# Patient Record
Sex: FEMALE | Race: WHITE | NOT HISPANIC OR LATINO | Employment: FULL TIME | URBAN - METROPOLITAN AREA
[De-identification: names, ages, dates, MRNs, and addresses within clinical notes are randomized per-mention and may not be internally consistent; named-entity substitution may affect disease eponyms.]

---

## 2019-04-12 ENCOUNTER — HOSPITAL ENCOUNTER (OUTPATIENT)
Dept: FAMILY MEDICINE CLINIC | Facility: CLINIC | Age: 29
Setting detail: SPECIMEN
Discharge: HOME | End: 2019-04-12
Attending: FAMILY MEDICINE | Admitting: FAMILY MEDICINE

## 2019-04-12 LAB
ALBUMIN SERPL-MCNC: 4 G/DL (ref 3.5–4.8)
ALBUMIN/GLOB SERPL: 1.4 {RATIO} (ref 1–1.7)
ALP SERPL-CCNC: 59 IU/L (ref 32–91)
ALT SERPL-CCNC: 19 IU/L (ref 14–54)
ANION GAP SERPL CALC-SCNC: 14 MMOL/L (ref 10–20)
AST SERPL-CCNC: 21 IU/L (ref 15–41)
BASOPHILS # BLD AUTO: 0 10*3/UL (ref 0–0.2)
BASOPHILS NFR BLD AUTO: 1 % (ref 0–2)
BILIRUB SERPL-MCNC: 1 MG/DL (ref 0.3–1.2)
BUN SERPL-MCNC: 12 MG/DL (ref 8–20)
BUN/CREAT SERPL: 17.1 (ref 5.4–26.2)
CALCIUM SERPL-MCNC: 9 MG/DL (ref 8.9–10.3)
CHLORIDE SERPL-SCNC: 102 MMOL/L (ref 101–111)
CHOLEST SERPL-MCNC: 153 MG/DL
CHOLEST/HDLC SERPL: 3 {RATIO}
CONV CO2: 23 MMOL/L (ref 22–32)
CONV LDL CHOLESTEROL DIRECT: 87 MG/DL (ref 0–100)
CONV TOTAL PROTEIN: 6.8 G/DL (ref 6.1–7.9)
CREAT UR-MCNC: 0.7 MG/DL (ref 0.4–1)
DIFFERENTIAL METHOD BLD: (no result)
EOSINOPHIL # BLD AUTO: 0.1 10*3/UL (ref 0–0.3)
EOSINOPHIL # BLD AUTO: 1 % (ref 0–3)
ERYTHROCYTE [DISTWIDTH] IN BLOOD BY AUTOMATED COUNT: 13.5 % (ref 11.5–14.5)
GLOBULIN UR ELPH-MCNC: 2.8 G/DL (ref 2.5–3.8)
GLUCOSE SERPL-MCNC: 83 MG/DL (ref 65–99)
HCT VFR BLD AUTO: 38.5 % (ref 35–49)
HDLC SERPL-MCNC: 51 MG/DL
HGB BLD-MCNC: 13 G/DL (ref 12–15)
LDLC/HDLC SERPL: 1.7 {RATIO}
LIPID INTERPRETATION: NORMAL
LYMPHOCYTES # BLD AUTO: 3.1 10*3/UL (ref 0.8–4.8)
LYMPHOCYTES NFR BLD AUTO: 32 % (ref 18–42)
MCH RBC QN AUTO: 29.6 PG (ref 26–32)
MCHC RBC AUTO-ENTMCNC: 33.7 G/DL (ref 32–36)
MCV RBC AUTO: 87.9 FL (ref 80–94)
MONOCYTES # BLD AUTO: 0.9 10*3/UL (ref 0.1–1.3)
MONOCYTES NFR BLD AUTO: 9 % (ref 2–11)
NEUTROPHILS # BLD AUTO: 5.7 10*3/UL (ref 2.3–8.6)
NEUTROPHILS NFR BLD AUTO: 57 % (ref 50–75)
NRBC BLD AUTO-RTO: 0 /100{WBCS}
NRBC/RBC NFR BLD MANUAL: 0 10*3/UL
PLATELET # BLD AUTO: 217 10*3/UL (ref 150–450)
PMV BLD AUTO: 8.3 FL (ref 7.4–10.4)
POTASSIUM SERPL-SCNC: 4 MMOL/L (ref 3.6–5.1)
RBC # BLD AUTO: 4.38 10*6/UL (ref 4–5.4)
SODIUM SERPL-SCNC: 135 MMOL/L (ref 136–144)
TRIGL SERPL-MCNC: 47 MG/DL
VLDLC SERPL CALC-MCNC: 14.5 MG/DL
WBC # BLD AUTO: 9.9 10*3/UL (ref 4.5–11.5)

## 2019-05-14 ENCOUNTER — HOSPITAL ENCOUNTER (OUTPATIENT)
Dept: SLEEP MEDICINE | Facility: HOSPITAL | Age: 29
Discharge: HOME | End: 2019-05-14
Attending: INTERNAL MEDICINE | Admitting: INTERNAL MEDICINE

## 2019-06-19 ENCOUNTER — TELEPHONE (OUTPATIENT)
Dept: FAMILY MEDICINE CLINIC | Facility: CLINIC | Age: 29
End: 2019-06-19

## 2019-06-19 RX ORDER — SULFAMETHOXAZOLE AND TRIMETHOPRIM 800; 160 MG/1; MG/1
1 TABLET ORAL 2 TIMES DAILY
Qty: 14 TABLET | Refills: 0 | Status: SHIPPED | OUTPATIENT
Start: 2019-06-19 | End: 2019-06-26

## 2020-05-26 ENCOUNTER — LAB (OUTPATIENT)
Dept: FAMILY MEDICINE CLINIC | Facility: CLINIC | Age: 30
End: 2020-05-26

## 2020-05-26 ENCOUNTER — OFFICE VISIT (OUTPATIENT)
Dept: FAMILY MEDICINE CLINIC | Facility: CLINIC | Age: 30
End: 2020-05-26

## 2020-05-26 VITALS
TEMPERATURE: 97.8 F | HEART RATE: 78 BPM | BODY MASS INDEX: 49.28 KG/M2 | HEIGHT: 60 IN | SYSTOLIC BLOOD PRESSURE: 134 MMHG | DIASTOLIC BLOOD PRESSURE: 81 MMHG | WEIGHT: 251 LBS | OXYGEN SATURATION: 100 %

## 2020-05-26 DIAGNOSIS — E66.01 MORBID OBESITY WITH BODY MASS INDEX (BMI) OF 40.0 TO 49.9 (HCC): ICD-10-CM

## 2020-05-26 DIAGNOSIS — R63.5 ABNORMAL WEIGHT GAIN: ICD-10-CM

## 2020-05-26 DIAGNOSIS — R63.5 ABNORMAL WEIGHT GAIN: Primary | ICD-10-CM

## 2020-05-26 DIAGNOSIS — Z71.3 ENCOUNTER FOR WEIGHT LOSS COUNSELING: ICD-10-CM

## 2020-05-26 PROBLEM — E55.9 VITAMIN D DEFICIENCY: Status: ACTIVE | Noted: 2019-04-15

## 2020-05-26 LAB
ALBUMIN SERPL-MCNC: 4.4 G/DL (ref 3.5–5.2)
ALBUMIN/GLOB SERPL: 1.6 G/DL
ALP SERPL-CCNC: 66 U/L (ref 39–117)
ALT SERPL W P-5'-P-CCNC: 16 U/L (ref 1–33)
ANION GAP SERPL CALCULATED.3IONS-SCNC: 9.4 MMOL/L (ref 5–15)
AST SERPL-CCNC: 14 U/L (ref 1–32)
BILIRUB SERPL-MCNC: 0.3 MG/DL (ref 0.2–1.2)
BUN BLD-MCNC: 17 MG/DL (ref 6–20)
BUN/CREAT SERPL: 27.9 (ref 7–25)
CALCIUM SPEC-SCNC: 9.6 MG/DL (ref 8.6–10.5)
CHLORIDE SERPL-SCNC: 102 MMOL/L (ref 98–107)
CO2 SERPL-SCNC: 28.6 MMOL/L (ref 22–29)
CREAT BLD-MCNC: 0.61 MG/DL (ref 0.57–1)
GFR SERPL CREATININE-BSD FRML MDRD: 115 ML/MIN/1.73
GLOBULIN UR ELPH-MCNC: 2.7 GM/DL
GLUCOSE BLD-MCNC: 94 MG/DL (ref 65–99)
POTASSIUM BLD-SCNC: 4.4 MMOL/L (ref 3.5–5.2)
PROT SERPL-MCNC: 7.1 G/DL (ref 6–8.5)
SODIUM BLD-SCNC: 140 MMOL/L (ref 136–145)
T3FREE SERPL-MCNC: 3.82 PG/ML (ref 2–4.4)
T4 FREE SERPL-MCNC: 1.28 NG/DL (ref 0.93–1.7)
TSH SERPL DL<=0.05 MIU/L-ACNC: 2.33 UIU/ML (ref 0.27–4.2)

## 2020-05-26 PROCEDURE — 99213 OFFICE O/P EST LOW 20 MIN: CPT | Performed by: FAMILY MEDICINE

## 2020-05-26 PROCEDURE — 84439 ASSAY OF FREE THYROXINE: CPT | Performed by: FAMILY MEDICINE

## 2020-05-26 PROCEDURE — 36415 COLL VENOUS BLD VENIPUNCTURE: CPT | Performed by: FAMILY MEDICINE

## 2020-05-26 PROCEDURE — 84443 ASSAY THYROID STIM HORMONE: CPT | Performed by: FAMILY MEDICINE

## 2020-05-26 PROCEDURE — 84481 FREE ASSAY (FT-3): CPT | Performed by: FAMILY MEDICINE

## 2020-05-26 PROCEDURE — 80053 COMPREHEN METABOLIC PANEL: CPT | Performed by: FAMILY MEDICINE

## 2020-05-26 RX ORDER — PHENTERMINE HYDROCHLORIDE 37.5 MG/1
37.5 TABLET ORAL
Qty: 30 TABLET | Refills: 0 | Status: SHIPPED | OUTPATIENT
Start: 2020-05-26 | End: 2020-06-22 | Stop reason: SDUPTHER

## 2020-05-26 RX ORDER — COPPER 313.4 MG/1
INTRAUTERINE DEVICE INTRAUTERINE
COMMUNITY
Start: 2019-04-12

## 2020-05-26 NOTE — PATIENT INSTRUCTIONS
Keep a food diary  Try using an juan antonio to help monitor calories - ex [employer]  Drink lots of water  Can do weight watchers  Keep working to lose weight through healthy eating and exercise.

## 2020-05-26 NOTE — PROGRESS NOTES
Subjective   Ashley Kenney is a 30 y.o. female.     She is here today concerned about weight gain and wants to talk about her weight loss options  Weight has increased with the pandemic  Gyms were closed and has been eating more  Was going to the [facility] prior to this and still not seeing weight loss  As result of this she is getting very frustrated  The weight is causing her to be a little more short of breath with her activity and triggering joint pain  There is a family history of thyroid disease       The following portions of the patient's history were reviewed and updated as appropriate: allergies, current medications, past family history, past medical history, past social history, past surgical history and problem list.  History reviewed. No pertinent past medical history.  Past Surgical History:   Procedure Laterality Date   • DENTAL PROCEDURE       Family History   Problem Relation Age of Onset   • Hyperlipidemia Mother    • Hypertension Mother    • Cancer Father    • Diabetes Paternal Grandmother      Social History     Socioeconomic History   • Marital status:      Spouse name: Not on file   • Number of children: Not on file   • Years of education: Not on file   • Highest education level: Not on file   Tobacco Use   • Smoking status: Never Smoker   • Smokeless tobacco: Never Used   Substance and Sexual Activity   • Alcohol use: Yes   • Drug use: Never         Current Outpatient Medications:   •  Cetirizine HCl (ZYRTEC ALLERGY) 10 MG capsule, ZYRTEC ALLERGY 10 MG CAPS, Disp: , Rfl:   •  fluticasone (FLONASE) 50 MCG/ACT nasal spray, 2 sprays into the nostril(s) as directed by provider Daily., Disp: , Rfl:   •  Paragard Intrauterine Copper intrauterine device IUD, PARAGARD INTRAUTERINE COPPER IUD, Disp: , Rfl:   •  phentermine (ADIPEX-P) 37.5 MG tablet, Take 1 tablet by mouth Every Morning Before Breakfast., Disp: 30 tablet, Rfl: 0    Review of Systems   Constitutional: Positive for unexpected weight  "gain. Negative for diaphoresis, fatigue, fever and unexpected weight loss.   Respiratory: Positive for shortness of breath. Negative for cough and chest tightness.    Cardiovascular: Negative for chest pain, palpitations and leg swelling.   Gastrointestinal: Negative for nausea and vomiting.   Musculoskeletal: Positive for arthralgias.   Neurological: Negative for dizziness, syncope and headache.     /81 (BP Location: Right arm, Patient Position: Sitting, Cuff Size: Large Adult)   Pulse 78   Temp 97.8 °F (36.6 °C) (Temporal)   Ht 152.4 cm (60\")   Wt 114 kg (251 lb)   SpO2 100%   Breastfeeding No   BMI 49.02 kg/m²       Objective   Physical Exam   Constitutional: She appears well-developed and well-nourished. No distress. She is morbidly obese.  HENT:   Head: Normocephalic and atraumatic.   Neck: Neck supple. No thyromegaly present.   Cardiovascular: Normal rate, regular rhythm, normal heart sounds and intact distal pulses. Exam reveals no gallop and no friction rub.   No murmur heard.  Pulmonary/Chest: Effort normal and breath sounds normal. No respiratory distress. She has no wheezes. She has no rales.   Musculoskeletal: She exhibits no edema.   Lymphadenopathy:     She has no cervical adenopathy.   Neurological: She is alert.   Skin: Skin is warm and dry.   Psychiatric: She has a normal mood and affect.   Nursing note and vitals reviewed.        Assessment/Plan   Problems Addressed this Visit     None      Visit Diagnoses     Abnormal weight gain    -  Primary    Relevant Orders    Comprehensive Metabolic Panel    TSH    T3, Free    T4, Free    Encounter for weight loss counseling        Relevant Medications    phentermine (ADIPEX-P) 37.5 MG tablet    Morbid obesity with body mass index (BMI) of 40.0 to 49.9 (CMS/[facility])        Relevant Medications    phentermine (ADIPEX-P) 37.5 MG tablet          She was counseled on the need for weight loss  Recommended that she start keeping a food diary  Recommended " that she make healthier food choices and start increasing her physical activity  Recommended that she use weight loss juan antonio like my fitness pal to help  Encouraged her to increase her water intake  I will start phentermine 37.5 mg and see her back in a month  I have also ordered blood work including a full thyroid panel  She is up-to-date on her Tdap with the last one being given in 2011

## 2020-05-26 NOTE — PROGRESS NOTES
"Subjective   Ashley Kenney is a 30 y.o. female.     Pt is here today to discuss weight loss options.        The following portions of the patient's history were reviewed and updated as appropriate: {history reviewed:20406::\"allergies\",\"current medications\",\"past family history\",\"past medical history\",\"past social history\",\"past surgical history\",\"problem list\"}.    Review of Systems    Objective   There were no vitals taken for this visit.  Physical Exam      Assessment/Plan     There are no diagnoses linked to this encounter.          Answers for HPI/ROS submitted by the patient on 5/20/2020   What is the primary reason for your visit?: Other  Please describe your symptoms.: I am needing to consult Dr. Fletcher about weight loss options  Have you had these symptoms before?: Yes  How long have you been having these symptoms?: Greater than 2 weeks    "

## 2020-06-22 DIAGNOSIS — Z71.3 ENCOUNTER FOR WEIGHT LOSS COUNSELING: ICD-10-CM

## 2020-06-22 DIAGNOSIS — E66.01 MORBID OBESITY WITH BODY MASS INDEX (BMI) OF 40.0 TO 49.9 (HCC): ICD-10-CM

## 2020-06-23 RX ORDER — PHENTERMINE HYDROCHLORIDE 37.5 MG/1
37.5 TABLET ORAL
Qty: 30 TABLET | Refills: 0 | Status: SHIPPED | OUTPATIENT
Start: 2020-06-23 | End: 2020-07-29 | Stop reason: SDUPTHER

## 2020-06-24 DIAGNOSIS — E66.01 MORBID OBESITY WITH BODY MASS INDEX (BMI) OF 40.0 TO 49.9 (HCC): ICD-10-CM

## 2020-06-24 DIAGNOSIS — Z71.3 ENCOUNTER FOR WEIGHT LOSS COUNSELING: ICD-10-CM

## 2020-06-25 RX ORDER — PHENTERMINE HYDROCHLORIDE 37.5 MG/1
TABLET ORAL
Qty: 30 TABLET | Refills: 0 | OUTPATIENT
Start: 2020-06-25

## 2020-07-01 ENCOUNTER — OFFICE VISIT (OUTPATIENT)
Dept: FAMILY MEDICINE CLINIC | Facility: CLINIC | Age: 30
End: 2020-07-01

## 2020-07-01 VITALS
WEIGHT: 228 LBS | DIASTOLIC BLOOD PRESSURE: 82 MMHG | TEMPERATURE: 97.7 F | BODY MASS INDEX: 44.76 KG/M2 | HEART RATE: 106 BPM | OXYGEN SATURATION: 99 % | HEIGHT: 60 IN | SYSTOLIC BLOOD PRESSURE: 120 MMHG

## 2020-07-01 DIAGNOSIS — Z71.3 WEIGHT LOSS COUNSELING, ENCOUNTER FOR: Primary | ICD-10-CM

## 2020-07-01 DIAGNOSIS — E66.01 MORBID OBESITY WITH BMI OF 40.0-44.9, ADULT (HCC): ICD-10-CM

## 2020-07-01 PROCEDURE — 99213 OFFICE O/P EST LOW 20 MIN: CPT | Performed by: FAMILY MEDICINE

## 2020-07-01 NOTE — PROGRESS NOTES
Subjective   Ashley Kenney is a 30 y.o. female.     Here for follow-up after her first month on phentermine  She has lost 13 pounds  Walking 2 miles a day  Tolerating the medication w/o prob       The following portions of the patient's history were reviewed and updated as appropriate: allergies, current medications, past family history, past medical history, past social history, past surgical history and problem list.  History reviewed. No pertinent past medical history.  Past Surgical History:   Procedure Laterality Date   • DENTAL PROCEDURE       Family History   Problem Relation Age of Onset   • Hyperlipidemia Mother    • Hypertension Mother    • Cancer Father    • Diabetes Paternal Grandmother      Social History     Socioeconomic History   • Marital status:      Spouse name: Not on file   • Number of children: Not on file   • Years of education: Not on file   • Highest education level: Not on file   Tobacco Use   • Smoking status: Never Smoker   • Smokeless tobacco: Never Used   Substance and Sexual Activity   • Alcohol use: Yes   • Drug use: Never         Current Outpatient Medications:   •  Cetirizine HCl (ZYRTEC ALLERGY) 10 MG capsule, ZYRTEC ALLERGY 10 MG CAPS, Disp: , Rfl:   •  fluticasone (FLONASE) 50 MCG/ACT nasal spray, 2 sprays into the nostril(s) as directed by provider Daily., Disp: , Rfl:   •  Paragard Intrauterine Copper intrauterine device IUD, PARAGARD INTRAUTERINE COPPER IUD, Disp: , Rfl:   •  phentermine (ADIPEX-P) 37.5 MG tablet, Take 1 tablet by mouth Every Morning Before Breakfast., Disp: 30 tablet, Rfl: 0    Review of Systems   Constitutional: Negative for diaphoresis, fatigue, fever, unexpected weight gain and unexpected weight loss.   Respiratory: Negative for cough, chest tightness and shortness of breath.    Cardiovascular: Negative for chest pain, palpitations and leg swelling.   Gastrointestinal: Negative for nausea and vomiting.   Neurological: Negative for dizziness,  "syncope and headache.     /82 (BP Location: Left arm, Patient Position: Sitting, Cuff Size: Large Adult)   Pulse 106   Temp 97.7 °F (36.5 °C) (Temporal)   Ht 152.4 cm (60\")   Wt 103 kg (228 lb)   SpO2 99%   Breastfeeding No   BMI 44.53 kg/m²       Objective   Physical Exam   Constitutional: She appears well-developed and well-nourished. No distress. She is morbidly obese.  HENT:   Head: Normocephalic and atraumatic.   Neck: Neck supple.   Cardiovascular: Normal rate, regular rhythm, normal heart sounds and intact distal pulses. Exam reveals no gallop and no friction rub.   No murmur heard.  Pulmonary/Chest: Effort normal and breath sounds normal. No respiratory distress. She has no wheezes. She has no rales.   Musculoskeletal: She exhibits no edema.   Neurological: She is alert.   Skin: Skin is warm and dry.   Psychiatric: She has a normal mood and affect.   Nursing note and vitals reviewed.        Assessment/Plan   Problems Addressed this Visit        Digestive    Morbid obesity with BMI of 40.0-44.9, adult (CMS/HCC)       Other    Weight loss counseling, encounter for - Primary        She is doing very well  Her phentermine prescription was refilled last week secondary to the delay in her being able to get her appointment  I will see her back in a month  She was counseled on the importance of continuing to do her exercise and make improve dietary choices         "

## 2020-07-29 ENCOUNTER — OFFICE VISIT (OUTPATIENT)
Dept: FAMILY MEDICINE CLINIC | Facility: CLINIC | Age: 30
End: 2020-07-29

## 2020-07-29 VITALS
TEMPERATURE: 98.9 F | BODY MASS INDEX: 43.59 KG/M2 | SYSTOLIC BLOOD PRESSURE: 138 MMHG | HEART RATE: 69 BPM | WEIGHT: 222 LBS | OXYGEN SATURATION: 100 % | HEIGHT: 60 IN | DIASTOLIC BLOOD PRESSURE: 82 MMHG

## 2020-07-29 DIAGNOSIS — E66.01 MORBID OBESITY WITH BODY MASS INDEX (BMI) OF 40.0 TO 49.9 (HCC): ICD-10-CM

## 2020-07-29 DIAGNOSIS — Z71.3 ENCOUNTER FOR WEIGHT LOSS COUNSELING: ICD-10-CM

## 2020-07-29 PROCEDURE — 99213 OFFICE O/P EST LOW 20 MIN: CPT | Performed by: FAMILY MEDICINE

## 2020-07-29 RX ORDER — PHENTERMINE HYDROCHLORIDE 37.5 MG/1
37.5 TABLET ORAL
Qty: 30 TABLET | Refills: 0 | Status: SHIPPED | OUTPATIENT
Start: 2020-07-29 | End: 2020-08-28 | Stop reason: SDUPTHER

## 2020-07-29 NOTE — PROGRESS NOTES
Subjective   Ashley Kenney is a 30 y.o. female.     She is here for follow-up after her second month on phentermine  She has lost 6 more pounds  She started her period today  She also started strength training in addition to cardio  Using [employer] juan antonio         The following portions of the patient's history were reviewed and updated as appropriate: allergies, current medications, past family history, past medical history, past social history, past surgical history and problem list.  History reviewed. No pertinent past medical history.  Past Surgical History:   Procedure Laterality Date   • DENTAL PROCEDURE       Family History   Problem Relation Age of Onset   • Hyperlipidemia Mother    • Hypertension Mother    • Cancer Father    • Diabetes Paternal Grandmother      Social History     Socioeconomic History   • Marital status:      Spouse name: Not on file   • Number of children: Not on file   • Years of education: Not on file   • Highest education level: Not on file   Tobacco Use   • Smoking status: Never Smoker   • Smokeless tobacco: Never Used   Substance and Sexual Activity   • Alcohol use: Yes   • Drug use: Never         Current Outpatient Medications:   •  Cetirizine HCl (ZYRTEC ALLERGY) 10 MG capsule, ZYRTEC ALLERGY 10 MG CAPS, Disp: , Rfl:   •  fluticasone (FLONASE) 50 MCG/ACT nasal spray, 2 sprays into the nostril(s) as directed by provider Daily., Disp: , Rfl:   •  Paragard Intrauterine Copper intrauterine device IUD, PARAGARD INTRAUTERINE COPPER IUD, Disp: , Rfl:   •  phentermine (ADIPEX-P) 37.5 MG tablet, Take 1 tablet by mouth Every Morning Before Breakfast., Disp: 30 tablet, Rfl: 0    Review of Systems   Constitutional: Negative for diaphoresis, fatigue, fever, unexpected weight gain and unexpected weight loss.   Respiratory: Negative for cough, chest tightness and shortness of breath.    Cardiovascular: Negative for chest pain, palpitations and leg swelling.   Gastrointestinal: Negative  "for nausea and vomiting.   Neurological: Negative for dizziness, syncope and headache.     /82 (BP Location: Left arm, Patient Position: Sitting, Cuff Size: Large Adult)   Pulse 69   Temp 98.9 °F (37.2 °C) (Temporal)   Ht 152.4 cm (60\")   Wt 101 kg (222 lb)   SpO2 100%   Breastfeeding No   BMI 43.36 kg/m²       Objective   Physical Exam   Constitutional: She appears well-developed and well-nourished. No distress. She is morbidly obese.  HENT:   Head: Normocephalic and atraumatic.   Neck: Neck supple.   Cardiovascular: Normal rate, regular rhythm, normal heart sounds and intact distal pulses. Exam reveals no gallop and no friction rub.   No murmur heard.  Pulmonary/Chest: Effort normal and breath sounds normal. No respiratory distress. She has no wheezes. She has no rales.   Musculoskeletal: She exhibits no edema.   Neurological: She is alert.   Skin: Skin is warm and dry.   Psychiatric: She has a normal mood and affect.   Nursing note and vitals reviewed.  2      Assessment/Plan   Problems Addressed this Visit     None      Visit Diagnoses     Encounter for weight loss counseling        Relevant Medications    phentermine (ADIPEX-P) 37.5 MG tablet    Morbid obesity with body mass index (BMI) of 40.0 to 49.9 (CMS/[facility])        Relevant Medications    phentermine (ADIPEX-P) 37.5 MG tablet          Counseled on the need to for continued weight loss  Refilled the phentermine  Will see her back in a month       "

## 2020-07-29 NOTE — PATIENT INSTRUCTIONS
Keep calories around [id number]   Stay active  Keep working to lose weight through healthy eating and exercise.

## 2020-08-28 ENCOUNTER — OFFICE VISIT (OUTPATIENT)
Dept: FAMILY MEDICINE CLINIC | Facility: CLINIC | Age: 30
End: 2020-08-28

## 2020-08-28 VITALS
DIASTOLIC BLOOD PRESSURE: 84 MMHG | SYSTOLIC BLOOD PRESSURE: 122 MMHG | BODY MASS INDEX: 40.84 KG/M2 | HEIGHT: 60 IN | WEIGHT: 208 LBS | HEART RATE: 81 BPM | OXYGEN SATURATION: 100 % | TEMPERATURE: 98.2 F

## 2020-08-28 DIAGNOSIS — E66.01 MORBID OBESITY WITH BODY MASS INDEX (BMI) OF 40.0 TO 49.9 (HCC): ICD-10-CM

## 2020-08-28 DIAGNOSIS — Z71.3 ENCOUNTER FOR WEIGHT LOSS COUNSELING: ICD-10-CM

## 2020-08-28 PROCEDURE — 99213 OFFICE O/P EST LOW 20 MIN: CPT | Performed by: FAMILY MEDICINE

## 2020-08-28 RX ORDER — PHENTERMINE HYDROCHLORIDE 37.5 MG/1
37.5 TABLET ORAL
Qty: 30 TABLET | Refills: 0 | Status: SHIPPED | OUTPATIENT
Start: 2020-08-28 | End: 2020-09-25

## 2020-08-28 NOTE — PROGRESS NOTES
Subjective   Ashley Kenney is a 30 y.o. female.     Comes here for follow-up after her third month on phentermine  She has lost 14 more pounds  She feels well  She is tolerating the medication w/o problem       The following portions of the patient's history were reviewed and updated as appropriate: allergies, current medications, past family history, past medical history, past social history, past surgical history and problem list.  History reviewed. No pertinent past medical history.  Past Surgical History:   Procedure Laterality Date   • DENTAL PROCEDURE       Family History   Problem Relation Age of Onset   • Hyperlipidemia Mother    • Hypertension Mother    • Cancer Father    • Diabetes Paternal Grandmother      Social History     Socioeconomic History   • Marital status:      Spouse name: Not on file   • Number of children: Not on file   • Years of education: Not on file   • Highest education level: Not on file   Tobacco Use   • Smoking status: Never Smoker   • Smokeless tobacco: Never Used   Substance and Sexual Activity   • Alcohol use: Yes   • Drug use: Never         Current Outpatient Medications:   •  Cetirizine HCl (ZYRTEC ALLERGY) 10 MG capsule, ZYRTEC ALLERGY 10 MG CAPS, Disp: , Rfl:   •  fluticasone (FLONASE) 50 MCG/ACT nasal spray, 2 sprays into the nostril(s) as directed by provider Daily., Disp: , Rfl:   •  Paragard Intrauterine Copper intrauterine device IUD, PARAGARD INTRAUTERINE COPPER IUD, Disp: , Rfl:   •  phentermine (ADIPEX-P) 37.5 MG tablet, Take 1 tablet by mouth Every Morning Before Breakfast., Disp: 30 tablet, Rfl: 0    Review of Systems   Constitutional: Negative for diaphoresis, fatigue, fever, unexpected weight gain and unexpected weight loss.   Respiratory: Negative for cough, chest tightness and shortness of breath.    Cardiovascular: Negative for chest pain, palpitations and leg swelling.   Gastrointestinal: Negative for nausea and vomiting.   Neurological: Negative for  "dizziness, syncope and headache.     /84 (BP Location: Left arm, Patient Position: Sitting, Cuff Size: Large Adult)   Pulse 81   Temp 98.2 °F (36.8 °C) (Temporal)   Ht 152.4 cm (60\")   Wt 94.3 kg (208 lb)   SpO2 100%   Breastfeeding No   BMI 40.62 kg/m²       Objective   Physical Exam   Constitutional: She appears well-developed and well-nourished. No distress. She is morbidly obese.  HENT:   Head: Normocephalic and atraumatic.   Neck: Neck supple.   Cardiovascular: Normal rate, regular rhythm, normal heart sounds and intact distal pulses. Exam reveals no gallop and no friction rub.   No murmur heard.  Pulmonary/Chest: Effort normal and breath sounds normal. No respiratory distress. She has no wheezes. She has no rales.   Musculoskeletal: She exhibits no edema.   Neurological: She is alert.   Skin: Skin is warm and dry.   Psychiatric: She has a normal mood and affect.   Nursing note and vitals reviewed.        Assessment/Plan   Problems Addressed this Visit     None      Visit Diagnoses     Encounter for weight loss counseling        Relevant Medications    phentermine (ADIPEX-P) 37.5 MG tablet    Morbid obesity with body mass index (BMI) of 40.0 to 49.9 (CMS/[facility])        Relevant Medications    phentermine (ADIPEX-P) 37.5 MG tablet          Doing very well on the phentermine; tolerating the prescription without problem; prescription refilled; will follow up in a month  Counseled on the need for continued weight loss       "

## 2020-09-25 ENCOUNTER — OFFICE VISIT (OUTPATIENT)
Dept: FAMILY MEDICINE CLINIC | Facility: CLINIC | Age: 30
End: 2020-09-25

## 2020-09-25 VITALS
SYSTOLIC BLOOD PRESSURE: 120 MMHG | DIASTOLIC BLOOD PRESSURE: 79 MMHG | BODY MASS INDEX: 39.07 KG/M2 | HEART RATE: 97 BPM | TEMPERATURE: 98.6 F | OXYGEN SATURATION: 100 % | HEIGHT: 60 IN | WEIGHT: 199 LBS

## 2020-09-25 DIAGNOSIS — Z71.3 WEIGHT LOSS COUNSELING, ENCOUNTER FOR: Primary | ICD-10-CM

## 2020-09-25 PROCEDURE — 99213 OFFICE O/P EST LOW 20 MIN: CPT | Performed by: FAMILY MEDICINE

## 2020-09-25 RX ORDER — PHENTERMINE HYDROCHLORIDE 15 MG/1
15 CAPSULE ORAL EVERY MORNING
Qty: 90 CAPSULE | Refills: 0 | Status: SHIPPED | OUTPATIENT
Start: 2020-09-25 | End: 2020-12-18 | Stop reason: SDUPTHER

## 2020-09-25 RX ORDER — TOPIRAMATE 25 MG/1
25 TABLET ORAL DAILY
Qty: 90 TABLET | Refills: 0 | Status: SHIPPED | OUTPATIENT
Start: 2020-09-25 | End: 2020-12-18 | Stop reason: SDUPTHER

## 2020-09-25 NOTE — PROGRESS NOTES
Subjective   Ashley Kenney is a 30 y.o. female.     Here for follow-up after her fourth month on phentermine  She started at 251 pounds and is now down to 199 pounds  She is tolerating the medication w/o prob  She is exercising and making better food choices       The following portions of the patient's history were reviewed and updated as appropriate: allergies, current medications, past family history, past medical history, past social history, past surgical history and problem list.  History reviewed. No pertinent past medical history.  Past Surgical History:   Procedure Laterality Date   • DENTAL PROCEDURE       Family History   Problem Relation Age of Onset   • Hyperlipidemia Mother    • Hypertension Mother    • Cancer Father    • Diabetes Paternal Grandmother      Social History     Socioeconomic History   • Marital status:      Spouse name: Not on file   • Number of children: Not on file   • Years of education: Not on file   • Highest education level: Not on file   Tobacco Use   • Smoking status: Never Smoker   • Smokeless tobacco: Never Used   Substance and Sexual Activity   • Alcohol use: Yes   • Drug use: Never         Current Outpatient Medications:   •  Cetirizine HCl (ZYRTEC ALLERGY) 10 MG capsule, ZYRTEC ALLERGY 10 MG CAPS, Disp: , Rfl:   •  fluticasone (FLONASE) 50 MCG/ACT nasal spray, 2 sprays into the nostril(s) as directed by provider Daily., Disp: , Rfl:   •  Paragard Intrauterine Copper intrauterine device IUD, PARAGARD INTRAUTERINE COPPER IUD, Disp: , Rfl:   •  phentermine 15 MG capsule, Take 1 capsule by mouth Every Morning., Disp: 90 capsule, Rfl: 0  •  topiramate (Topamax) 25 MG tablet, Take 1 tablet by mouth Daily., Disp: 90 tablet, Rfl: 0    Review of Systems   Constitutional: Negative.    Respiratory: Negative.    Cardiovascular: Negative.    Gastrointestinal: Negative for nausea and vomiting.   Endocrine: Negative.    Neurological: Negative for dizziness and headache.     BP  "120/79 (BP Location: Right arm, Patient Position: Sitting, Cuff Size: Large Adult)   Pulse 97   Temp 98.6 °F (37 °C) (Temporal)   Ht 152.4 cm (60\")   Wt 90.3 kg (199 lb)   SpO2 100%   Breastfeeding No   BMI 38.86 kg/m²       Objective   Physical Exam  Vitals signs and nursing note reviewed.   Constitutional:       Appearance: Normal appearance. She is obese. She is not ill-appearing.   HENT:      Head: Normocephalic and atraumatic.   Cardiovascular:      Rate and Rhythm: Normal rate and regular rhythm.      Pulses: Normal pulses.      Heart sounds: Normal heart sounds. No murmur.   Pulmonary:      Effort: Pulmonary effort is normal.   Musculoskeletal:      Right lower leg: No edema.      Left lower leg: No edema.   Skin:     General: Skin is warm and dry.   Neurological:      Mental Status: She is alert.   Psychiatric:         Mood and Affect: Mood normal.           Assessment/Plan   Problems Addressed this Visit        Other    Weight loss counseling, encounter for - Primary    Relevant Medications    phentermine 15 MG capsule    topiramate (Topamax) 25 MG tablet    Body mass index (bmi) 38.0-38.9, adult    Relevant Medications    phentermine 15 MG capsule    topiramate (Topamax) 25 MG tablet      Diagnoses       Codes Comments    Weight loss counseling, encounter for    -  Primary ICD-10-CM: Z71.3  ICD-9-CM: V65.3     Body mass index (bmi) 38.0-38.9, adult     ICD-10-CM: Z68.38  ICD-9-CM: V85.38           Will stop the phentermine 37.5 and start her on a lower dose and add topamax 25mg  Counseled her on the need for continued weight loss  Will see her back in 3 mo       "

## 2020-12-18 ENCOUNTER — OFFICE VISIT (OUTPATIENT)
Dept: FAMILY MEDICINE CLINIC | Facility: CLINIC | Age: 30
End: 2020-12-18

## 2020-12-18 VITALS
DIASTOLIC BLOOD PRESSURE: 81 MMHG | SYSTOLIC BLOOD PRESSURE: 113 MMHG | HEART RATE: 83 BPM | WEIGHT: 179.8 LBS | HEIGHT: 62 IN | BODY MASS INDEX: 33.09 KG/M2 | TEMPERATURE: 97.8 F | OXYGEN SATURATION: 100 %

## 2020-12-18 DIAGNOSIS — Z71.3 WEIGHT LOSS COUNSELING, ENCOUNTER FOR: ICD-10-CM

## 2020-12-18 PROCEDURE — 99213 OFFICE O/P EST LOW 20 MIN: CPT | Performed by: FAMILY MEDICINE

## 2020-12-18 RX ORDER — TOPIRAMATE 25 MG/1
25 TABLET ORAL DAILY
Qty: 90 TABLET | Refills: 0 | Status: SHIPPED | OUTPATIENT
Start: 2020-12-18 | End: 2021-03-30 | Stop reason: SDUPTHER

## 2020-12-18 RX ORDER — PHENTERMINE HYDROCHLORIDE 15 MG/1
15 CAPSULE ORAL EVERY MORNING
Qty: 90 CAPSULE | Refills: 0 | Status: SHIPPED | OUTPATIENT
Start: 2020-12-18 | End: 2021-03-30 | Stop reason: SDUPTHER

## 2020-12-18 NOTE — PATIENT INSTRUCTIONS
Keep working to lose weight through healthy eating and exercise.  Hot showers and warm compresses to neck and shoulders.

## 2020-12-18 NOTE — PROGRESS NOTES
Subjective   Ashley Kenney is a 30 y.o. female.     She is here for follow up after her first 3 months on phentermine/topamax  She is now down to her pre-baby weight  She has lost another 20 pounds  She is eating better and is exercising  Having shoulder pain - bilateral  Seeing chiro today  Doing yoga  Feels like her right side is stiffer than the left  Sores/infected hair  Take a while to heal       The following portions of the patient's history were reviewed and updated as appropriate: allergies, current medications, past family history, past medical history, past social history, past surgical history and problem list.  History reviewed. No pertinent past medical history.  Past Surgical History:   Procedure Laterality Date   • DENTAL PROCEDURE       Family History   Problem Relation Age of Onset   • Hyperlipidemia Mother    • Hypertension Mother    • Cancer Father    • Diabetes Paternal Grandmother      Social History     Socioeconomic History   • Marital status:      Spouse name: Not on file   • Number of children: Not on file   • Years of education: Not on file   • Highest education level: Not on file   Tobacco Use   • Smoking status: Never Smoker   • Smokeless tobacco: Never Used   Substance and Sexual Activity   • Alcohol use: Yes     Comment: occ   • Drug use: Never         Current Outpatient Medications:   •  Cetirizine HCl (ZYRTEC ALLERGY) 10 MG capsule, ZYRTEC ALLERGY 10 MG CAPS, Disp: , Rfl:   •  fluticasone (FLONASE) 50 MCG/ACT nasal spray, 2 sprays into the nostril(s) as directed by provider Daily., Disp: , Rfl:   •  Paragard Intrauterine Copper intrauterine device IUD, PARAGARD INTRAUTERINE COPPER IUD, Disp: , Rfl:   •  phentermine 15 MG capsule, Take 1 capsule by mouth Every Morning., Disp: 90 capsule, Rfl: 0  •  topiramate (Topamax) 25 MG tablet, Take 1 tablet by mouth Daily., Disp: 90 tablet, Rfl: 0    Review of Systems   Constitutional: Negative for diaphoresis, fatigue, fever,  "unexpected weight gain and unexpected weight loss.   Respiratory: Negative for cough, chest tightness and shortness of breath.    Cardiovascular: Negative for chest pain, palpitations and leg swelling.   Gastrointestinal: Negative for nausea and vomiting.   Musculoskeletal: Positive for arthralgias and neck pain.   Neurological: Negative for dizziness, syncope and headache.     /81 (BP Location: Right arm, Patient Position: Sitting, Cuff Size: Large Adult)   Pulse 83   Temp 97.8 °F (36.6 °C) (Temporal)   Ht 156.2 cm (61.5\")   Wt 81.6 kg (179 lb 12.8 oz)   SpO2 100%   Breastfeeding No   BMI 33.42 kg/m²       Objective   Physical Exam  Vitals signs and nursing note reviewed.   Constitutional:       General: She is not in acute distress.     Appearance: Normal appearance. She is well-developed. She is obese.   HENT:      Head: Normocephalic and atraumatic.   Neck:      Musculoskeletal: Neck supple.      Thyroid: No thyromegaly.      Vascular: No JVD.   Cardiovascular:      Rate and Rhythm: Normal rate and regular rhythm.      Heart sounds: Normal heart sounds. No murmur. No friction rub. No gallop.    Pulmonary:      Effort: Pulmonary effort is normal. No respiratory distress.      Breath sounds: Normal breath sounds. No wheezing or rales.   Musculoskeletal:      Right lower leg: No edema.      Left lower leg: No edema.   Lymphadenopathy:      Cervical: No cervical adenopathy.   Skin:     General: Skin is warm and dry.   Neurological:      Mental Status: She is alert.   Psychiatric:         Mood and Affect: Mood normal.         Behavior: Behavior is cooperative.           Assessment/Plan   Problems Addressed this Visit        Other    Weight loss counseling, encounter for    Relevant Medications    phentermine 15 MG capsule    topiramate (Topamax) 25 MG tablet    BMI 34.0-34.9,adult - Primary    Relevant Medications    phentermine 15 MG capsule    topiramate (Topamax) 25 MG tablet      Other Visit " Diagnoses     Body mass index (BMI) of 38.0 to 38.9 in adult          Diagnoses       Codes Comments    BMI 34.0-34.9,adult    -  Primary ICD-10-CM: Z68.34  ICD-9-CM: V85.34     Weight loss counseling, encounter for     ICD-10-CM: Z71.3  ICD-9-CM: V65.3     Body mass index (BMI) of 38.0 to 38.9 in adult     ICD-10-CM: Z68.38  ICD-9-CM: V85.38         She will continue the combination of phentermine and topamax  Will see her back in 3mo  Counseled on the need for continued weight loss through exercise and improved diet

## 2021-03-30 ENCOUNTER — OFFICE VISIT (OUTPATIENT)
Dept: FAMILY MEDICINE CLINIC | Facility: CLINIC | Age: 31
End: 2021-03-30

## 2021-03-30 VITALS
BODY MASS INDEX: 31.65 KG/M2 | TEMPERATURE: 97.3 F | HEIGHT: 62 IN | DIASTOLIC BLOOD PRESSURE: 78 MMHG | OXYGEN SATURATION: 100 % | WEIGHT: 172 LBS | SYSTOLIC BLOOD PRESSURE: 120 MMHG | HEART RATE: 81 BPM

## 2021-03-30 DIAGNOSIS — Z71.3 WEIGHT LOSS COUNSELING, ENCOUNTER FOR: ICD-10-CM

## 2021-03-30 PROCEDURE — 99213 OFFICE O/P EST LOW 20 MIN: CPT | Performed by: FAMILY MEDICINE

## 2021-03-30 RX ORDER — TOPIRAMATE 25 MG/1
25 TABLET ORAL DAILY
Qty: 90 TABLET | Refills: 0 | Status: SHIPPED | OUTPATIENT
Start: 2021-03-30 | End: 2022-03-25

## 2021-03-30 RX ORDER — PHENTERMINE HYDROCHLORIDE 15 MG/1
15 CAPSULE ORAL EVERY MORNING
Qty: 90 CAPSULE | Refills: 0 | Status: SHIPPED | OUTPATIENT
Start: 2021-03-30 | End: 2022-03-25

## 2021-03-30 NOTE — PROGRESS NOTES
Subjective   Ashley Kenney is a 31 y.o. female.     She is here for follow up on her weight loss using phentermine and topamax  She has lost 7 more pounds  She is tolerating the medication without problem  She is exercising  She is using pictures to follow her weight loss progress       The following portions of the patient's history were reviewed and updated as appropriate: allergies, current medications, past family history, past medical history, past social history, past surgical history, and problem list.  History reviewed. No pertinent past medical history.  Past Surgical History:   Procedure Laterality Date   • DENTAL PROCEDURE       Family History   Problem Relation Age of Onset   • Hyperlipidemia Mother    • Hypertension Mother    • Cancer Father    • Diabetes Paternal Grandmother      Social History     Socioeconomic History   • Marital status:      Spouse name: Not on file   • Number of children: Not on file   • Years of education: Not on file   • Highest education level: Not on file   Tobacco Use   • Smoking status: Never Smoker   • Smokeless tobacco: Never Used   Substance and Sexual Activity   • Alcohol use: Yes     Comment: occ   • Drug use: Never         Current Outpatient Medications:   •  Cetirizine HCl (ZYRTEC ALLERGY) 10 MG capsule, ZYRTEC ALLERGY 10 MG CAPS, Disp: , Rfl:   •  fluticasone (FLONASE) 50 MCG/ACT nasal spray, 2 sprays into the nostril(s) as directed by provider Daily., Disp: , Rfl:   •  Paragard Intrauterine Copper intrauterine device IUD, PARAGARD INTRAUTERINE COPPER IUD, Disp: , Rfl:   •  phentermine 15 MG capsule, Take 1 capsule by mouth Every Morning., Disp: 90 capsule, Rfl: 0  •  topiramate (Topamax) 25 MG tablet, Take 1 tablet by mouth Daily., Disp: 90 tablet, Rfl: 0    Review of Systems   Constitutional: Negative for diaphoresis, fatigue, fever, unexpected weight gain and unexpected weight loss.   Respiratory: Negative for cough, chest tightness and shortness of  "breath.    Cardiovascular: Negative for chest pain, palpitations and leg swelling.   Gastrointestinal: Negative for nausea and vomiting.   Neurological: Negative for dizziness, syncope and headache.     /78 (BP Location: Left arm, Patient Position: Sitting, Cuff Size: Large Adult)   Pulse 81   Temp 97.3 °F (36.3 °C) (Temporal)   Ht 156.2 cm (61.5\")   Wt 78 kg (172 lb)   SpO2 100%   Breastfeeding No   BMI 31.97 kg/m²       Objective   Physical Exam  Vitals and nursing note reviewed.   Constitutional:       General: She is not in acute distress.     Appearance: Normal appearance. She is well-developed and well-groomed. She is obese.   HENT:      Head: Normocephalic and atraumatic.   Neck:      Thyroid: No thyromegaly.   Cardiovascular:      Rate and Rhythm: Normal rate and regular rhythm.      Heart sounds: Normal heart sounds. No murmur heard.   No friction rub. No gallop.    Pulmonary:      Effort: Pulmonary effort is normal. No respiratory distress.      Breath sounds: Normal breath sounds. No wheezing or rales.   Musculoskeletal:      Cervical back: Neck supple.      Right lower leg: No edema.      Left lower leg: No edema.   Lymphadenopathy:      Cervical: No cervical adenopathy.   Skin:     General: Skin is warm and dry.   Neurological:      Mental Status: She is alert.   Psychiatric:         Mood and Affect: Mood normal.         Behavior: Behavior is cooperative.           Assessment/Plan   Problems Addressed this Visit        Endocrine and Metabolic    Weight loss counseling, encounter for    Relevant Medications    phentermine 15 MG capsule    topiramate (Topamax) 25 MG tablet    BMI 32.0-32.9,adult - Primary    Relevant Medications    phentermine 15 MG capsule    topiramate (Topamax) 25 MG tablet      Diagnoses       Codes Comments    BMI 32.0-32.9,adult    -  Primary ICD-10-CM: Z68.32  ICD-9-CM: V85.32     Weight loss counseling, encounter for     ICD-10-CM: Z71.3  ICD-9-CM: V65.3           She " was congratulated on her weight loss thus far  Prescriptions were refilled  She was counseled on the need for continued weight loss through healthy eating and exercise  Is talkiing about seeing a plastic surgeon to have her excess skin removed

## 2021-06-30 ENCOUNTER — LAB (OUTPATIENT)
Dept: FAMILY MEDICINE CLINIC | Facility: CLINIC | Age: 31
End: 2021-06-30

## 2021-06-30 ENCOUNTER — OFFICE VISIT (OUTPATIENT)
Dept: FAMILY MEDICINE CLINIC | Facility: CLINIC | Age: 31
End: 2021-06-30

## 2021-06-30 VITALS
SYSTOLIC BLOOD PRESSURE: 114 MMHG | HEART RATE: 78 BPM | HEIGHT: 62 IN | OXYGEN SATURATION: 100 % | BODY MASS INDEX: 27.42 KG/M2 | DIASTOLIC BLOOD PRESSURE: 78 MMHG | WEIGHT: 149 LBS | TEMPERATURE: 97.8 F

## 2021-06-30 DIAGNOSIS — H66.91 RIGHT OTITIS MEDIA, UNSPECIFIED OTITIS MEDIA TYPE: ICD-10-CM

## 2021-06-30 DIAGNOSIS — H60.501 ACUTE OTITIS EXTERNA OF RIGHT EAR, UNSPECIFIED TYPE: ICD-10-CM

## 2021-06-30 DIAGNOSIS — Z71.3 WEIGHT LOSS COUNSELING, ENCOUNTER FOR: ICD-10-CM

## 2021-06-30 DIAGNOSIS — R42 DIZZINESS: ICD-10-CM

## 2021-06-30 DIAGNOSIS — R42 DIZZINESS: Primary | ICD-10-CM

## 2021-06-30 PROBLEM — E66.01 MORBID OBESITY WITH BMI OF 40.0-44.9, ADULT (HCC): Status: RESOLVED | Noted: 2020-07-01 | Resolved: 2021-06-30

## 2021-06-30 LAB
ANION GAP SERPL CALCULATED.3IONS-SCNC: 8.7 MMOL/L (ref 5–15)
BASOPHILS # BLD AUTO: 0.04 10*3/MM3 (ref 0–0.2)
BASOPHILS NFR BLD AUTO: 0.8 % (ref 0–1.5)
BUN SERPL-MCNC: 8 MG/DL (ref 6–20)
BUN/CREAT SERPL: 11.1 (ref 7–25)
CALCIUM SPEC-SCNC: 9.2 MG/DL (ref 8.6–10.5)
CHLORIDE SERPL-SCNC: 104 MMOL/L (ref 98–107)
CO2 SERPL-SCNC: 26.3 MMOL/L (ref 22–29)
CREAT SERPL-MCNC: 0.72 MG/DL (ref 0.57–1)
DEPRECATED RDW RBC AUTO: 40.4 FL (ref 37–54)
EOSINOPHIL # BLD AUTO: 0.17 10*3/MM3 (ref 0–0.4)
EOSINOPHIL NFR BLD AUTO: 3.4 % (ref 0.3–6.2)
ERYTHROCYTE [DISTWIDTH] IN BLOOD BY AUTOMATED COUNT: 12.3 % (ref 12.3–15.4)
GFR SERPL CREATININE-BSD FRML MDRD: 94 ML/MIN/1.73
GLUCOSE SERPL-MCNC: 86 MG/DL (ref 65–99)
HCT VFR BLD AUTO: 42.6 % (ref 34–46.6)
HGB BLD-MCNC: 14.2 G/DL (ref 12–15.9)
IMM GRANULOCYTES # BLD AUTO: 0.01 10*3/MM3 (ref 0–0.05)
IMM GRANULOCYTES NFR BLD AUTO: 0.2 % (ref 0–0.5)
LYMPHOCYTES # BLD AUTO: 1.88 10*3/MM3 (ref 0.7–3.1)
LYMPHOCYTES NFR BLD AUTO: 37.2 % (ref 19.6–45.3)
MCH RBC QN AUTO: 30.5 PG (ref 26.6–33)
MCHC RBC AUTO-ENTMCNC: 33.3 G/DL (ref 31.5–35.7)
MCV RBC AUTO: 91.6 FL (ref 79–97)
MONOCYTES # BLD AUTO: 0.52 10*3/MM3 (ref 0.1–0.9)
MONOCYTES NFR BLD AUTO: 10.3 % (ref 5–12)
NEUTROPHILS NFR BLD AUTO: 2.44 10*3/MM3 (ref 1.7–7)
NEUTROPHILS NFR BLD AUTO: 48.1 % (ref 42.7–76)
NRBC BLD AUTO-RTO: 0 /100 WBC (ref 0–0.2)
PLATELET # BLD AUTO: 158 10*3/MM3 (ref 140–450)
PMV BLD AUTO: 12.1 FL (ref 6–12)
POTASSIUM SERPL-SCNC: 4.1 MMOL/L (ref 3.5–5.2)
RBC # BLD AUTO: 4.65 10*6/MM3 (ref 3.77–5.28)
SODIUM SERPL-SCNC: 139 MMOL/L (ref 136–145)
WBC # BLD AUTO: 5.06 10*3/MM3 (ref 3.4–10.8)

## 2021-06-30 PROCEDURE — 80048 BASIC METABOLIC PNL TOTAL CA: CPT | Performed by: FAMILY MEDICINE

## 2021-06-30 PROCEDURE — 85025 COMPLETE CBC W/AUTO DIFF WBC: CPT | Performed by: FAMILY MEDICINE

## 2021-06-30 PROCEDURE — 36415 COLL VENOUS BLD VENIPUNCTURE: CPT

## 2021-06-30 PROCEDURE — 99213 OFFICE O/P EST LOW 20 MIN: CPT | Performed by: FAMILY MEDICINE

## 2021-06-30 RX ORDER — OFLOXACIN 3 MG/ML
10 SOLUTION AURICULAR (OTIC) DAILY
Qty: 10 ML | Refills: 0 | Status: SHIPPED | OUTPATIENT
Start: 2021-06-30 | End: 2022-03-25

## 2021-06-30 RX ORDER — AMOXICILLIN 875 MG/1
875 TABLET, COATED ORAL 2 TIMES DAILY
Qty: 20 TABLET | Refills: 0 | Status: SHIPPED | OUTPATIENT
Start: 2021-06-30 | End: 2021-07-10

## 2021-06-30 NOTE — PROGRESS NOTES
Answers for HPI/ROS submitted by the patient on 6/28/2021  Please describe your symptoms.: Weight recheck  Have you had these symptoms before?: Yes  How long have you been having these symptoms?: Greater than 2 weeks  Please list any medications you are currently taking for this condition.: Phentermine and topirimate  What is the primary reason for your visit?: Other    Subjective   Ashley Kenney is a 31 y.o. female.     She is here for follow-up after another 3 months on phentermine and Topamax  She weighed 251 in May of last year  She has lost another 23 pounds and is now down to 149 pounds  She does not want to do any further weight loss medications  Just did a 3 week nutrition program with clean eating   She was a little dizzy this week  Right ear feels full  And has for a few weeks  She is still exercising at least 30 minutes every day       The following portions of the patient's history were reviewed and updated as appropriate: allergies, current medications, past family history, past medical history, past social history, past surgical history, and problem list.  History reviewed. No pertinent past medical history.  Past Surgical History:   Procedure Laterality Date   • DENTAL PROCEDURE       Family History   Problem Relation Age of Onset   • Hyperlipidemia Mother    • Hypertension Mother    • Cancer Father    • Diabetes Paternal Grandmother      Social History     Socioeconomic History   • Marital status:      Spouse name: Not on file   • Number of children: Not on file   • Years of education: Not on file   • Highest education level: Not on file   Tobacco Use   • Smoking status: Never Smoker   • Smokeless tobacco: Never Used   Vaping Use   • Vaping Use: Never used   Substance and Sexual Activity   • Alcohol use: Yes     Comment: occ   • Drug use: Never         Current Outpatient Medications:   •  Cetirizine HCl (ZYRTEC ALLERGY) 10 MG capsule, ZYRTEC ALLERGY 10 MG CAPS, Disp: , Rfl:   •  fluticasone  "(FLONASE) 50 MCG/ACT nasal spray, 2 sprays into the nostril(s) as directed by provider Daily., Disp: , Rfl:   •  Paragard Intrauterine Copper intrauterine device IUD, PARAGARD INTRAUTERINE COPPER IUD, Disp: , Rfl:   •  phentermine 15 MG capsule, Take 1 capsule by mouth Every Morning., Disp: 90 capsule, Rfl: 0  •  topiramate (Topamax) 25 MG tablet, Take 1 tablet by mouth Daily., Disp: 90 tablet, Rfl: 0  •  amoxicillin (AMOXIL) 875 MG tablet, Take 1 tablet by mouth 2 (Two) Times a Day for 10 days., Disp: 20 tablet, Rfl: 0  •  ofloxacin (FLOXIN) 0.3 % otic solution, Administer 10 drops to the right ear Daily., Disp: 10 mL, Rfl: 0    Review of Systems   Constitutional: Negative for diaphoresis, fatigue, fever, unexpected weight gain and unexpected weight loss.   HENT: Positive for ear pain.    Respiratory: Negative for cough, chest tightness and shortness of breath.    Cardiovascular: Negative for chest pain, palpitations and leg swelling.   Gastrointestinal: Negative for nausea and vomiting.   Neurological: Positive for dizziness. Negative for syncope and headache.     /78 (BP Location: Left arm, Patient Position: Sitting, Cuff Size: Adult)   Pulse 78   Temp 97.8 °F (36.6 °C) (Temporal)   Ht 156.2 cm (61.5\")   Wt 67.6 kg (149 lb)   SpO2 100%   Breastfeeding No   BMI 27.70 kg/m²       Objective   Physical Exam  Vitals and nursing note reviewed.   Constitutional:       General: She is not in acute distress.     Appearance: Normal appearance. She is well-developed, well-groomed and overweight.   HENT:      Head: Normocephalic and atraumatic.      Right Ear: Hearing and external ear normal. Swelling and tenderness present. Tympanic membrane is erythematous and bulging.      Left Ear: Hearing, tympanic membrane, ear canal and external ear normal.      Nose:      Right Sinus: No maxillary sinus tenderness or frontal sinus tenderness.      Left Sinus: No maxillary sinus tenderness or frontal sinus tenderness. "   Neck:      Thyroid: No thyromegaly.   Cardiovascular:      Rate and Rhythm: Normal rate and regular rhythm.      Heart sounds: Normal heart sounds. No murmur heard.   No friction rub. No gallop.    Pulmonary:      Effort: Pulmonary effort is normal. No respiratory distress.      Breath sounds: Normal breath sounds. No wheezing or rales.   Musculoskeletal:      Cervical back: Neck supple.   Lymphadenopathy:      Cervical: No cervical adenopathy.   Skin:     General: Skin is warm and dry.   Neurological:      Mental Status: She is alert.   Psychiatric:         Behavior: Behavior is cooperative.           Assessment/Plan   Problems Addressed this Visit        Endocrine and Metabolic    Weight loss counseling, encounter for      Other Visit Diagnoses     Dizziness    -  Primary    Relevant Orders    CBC & Differential    Basic Metabolic Panel    Right otitis media, unspecified otitis media type        Acute otitis externa of right ear, unspecified type          Diagnoses       Codes Comments    Dizziness    -  Primary ICD-10-CM: R42  ICD-9-CM: 780.4     Weight loss counseling, encounter for     ICD-10-CM: Z71.3  ICD-9-CM: V65.3     Right otitis media, unspecified otitis media type     ICD-10-CM: H66.91  ICD-9-CM: 382.9     Acute otitis externa of right ear, unspecified type     ICD-10-CM: H60.501  ICD-9-CM: 380.10         She is doing extremely well with her weight loss and will continue to do so through improved eating and exercise  She has decided not to pursue any further medication  She is using the [facility] [employer] [place]  She still plans to pursue excess skin removal  I will check a CBC and a BMP to further evaluate her dizziness  I suspect that the right otitis media is contributing to this  I will start her on Amoxil for the otitis media and Floxin for the otitis externa

## 2021-12-20 NOTE — TELEPHONE ENCOUNTER
Duplicate request. Filled on 6/23/20   [place] Border Text: The closure involved the vermilion border.

## 2022-03-14 ENCOUNTER — TELEPHONE (OUTPATIENT)
Dept: FAMILY MEDICINE CLINIC | Facility: CLINIC | Age: 32
End: 2022-03-14

## 2022-03-14 DIAGNOSIS — T78.1XXA FOOD SENSITIVITY WITH GASTROINTESTINAL SYMPTOMS: Primary | ICD-10-CM

## 2022-03-15 NOTE — TELEPHONE ENCOUNTER
----- Message from Lila Winkler MA sent at 3/14/2022 12:47 PM EDT -----  Regarding: FW: Food sensitivity test    ----- Message -----  From: Ashley Kenney  Sent: 3/14/2022  12:45 PM EDT  To: [facility]  Subject: Food sensitivity test                            Hello!   I hope your day is going well. I was wondering, is there a way to find out if I any food sensitivities? I'm starting to notice some gut health issues when I eat like grain pasta or milk products, and just wanted to see if I'm actually sensitive to them or not. Or if there is any other food that might be making me bloated and things.   I'd prefer not to do a skin prick test because I have to take allergy medicine daily. If I don't I get super itchy and congested.  I have heard of some people getting a blood test done to determine if they have any sensitivities. Is this something I could do?   -Ashley Kenney

## 2022-03-18 ENCOUNTER — LAB (OUTPATIENT)
Dept: FAMILY MEDICINE CLINIC | Facility: CLINIC | Age: 32
End: 2022-03-18

## 2022-03-18 DIAGNOSIS — T78.1XXA FOOD SENSITIVITY WITH GASTROINTESTINAL SYMPTOMS: ICD-10-CM

## 2022-03-18 PROCEDURE — 86003 ALLG SPEC IGE CRUDE XTRC EA: CPT | Performed by: FAMILY MEDICINE

## 2022-03-18 PROCEDURE — 36415 COLL VENOUS BLD VENIPUNCTURE: CPT

## 2022-03-23 LAB
CLAM IGE QN: <0.1 KU/L
CODFISH IGE QN: <0.1 KU/L
CONV CLASS DESCRIPTION: NORMAL
CORN IGE QN: <0.1 KU/L
COW MILK IGE QN: <0.1 KU/L
EGG WHITE IGE QN: <0.1 KU/L
PEANUT IGE QN: <0.1 KU/L
SCALLOP IGE QN: <0.1 KU/L
SESAME SEED IGE QN: <0.1 KU/L
SHRIMP IGE QN: <0.1 KU/L
SOYBEAN IGE QN: <0.1 KU/L
WALNUT IGE QN: <0.1 KU/L
WHEAT IGE QN: <0.1 KU/L

## 2022-03-25 ENCOUNTER — OFFICE VISIT (OUTPATIENT)
Dept: FAMILY MEDICINE CLINIC | Facility: CLINIC | Age: 32
End: 2022-03-25

## 2022-03-25 VITALS
HEART RATE: 79 BPM | SYSTOLIC BLOOD PRESSURE: 118 MMHG | TEMPERATURE: 98 F | DIASTOLIC BLOOD PRESSURE: 78 MMHG | OXYGEN SATURATION: 98 % | WEIGHT: 174 LBS | BODY MASS INDEX: 32.02 KG/M2 | HEIGHT: 62 IN

## 2022-03-25 DIAGNOSIS — E66.9 OBESITY (BMI 30.0-34.9): ICD-10-CM

## 2022-03-25 DIAGNOSIS — Z71.3 WEIGHT LOSS COUNSELING, ENCOUNTER FOR: Primary | ICD-10-CM

## 2022-03-25 DIAGNOSIS — H92.03 OTALGIA OF BOTH EARS: ICD-10-CM

## 2022-03-25 PROBLEM — E66.811 OBESITY (BMI 30.0-34.9): Status: ACTIVE | Noted: 2022-03-25

## 2022-03-25 PROCEDURE — 99213 OFFICE O/P EST LOW 20 MIN: CPT | Performed by: FAMILY MEDICINE

## 2022-03-25 RX ORDER — PHENTERMINE HYDROCHLORIDE 37.5 MG/1
37.5 TABLET ORAL
Qty: 30 TABLET | Refills: 0 | Status: SHIPPED | OUTPATIENT
Start: 2022-03-25 | End: 2022-04-22 | Stop reason: SDUPTHER

## 2022-03-25 NOTE — PROGRESS NOTES
"Answers for HPI/ROS submitted by the patient on 3/23/2022  Please describe your symptoms.: Weight check  Have you had these symptoms before?: Yes  How long have you been having these symptoms?: Greater than 2 weeks  Please list any medications you are currently taking for this condition.: None  Please describe any probable cause for these symptoms. : Stress increase, food cravings increase  What is the primary reason for your visit?: Other    Subjective   Ashley Kenney is a 32 y.o. female.     History of Present Illness   The patient consents to the use of MICHELLE.     The patient presents today to restart phentermine. She has been on this in the past. She has noticed recent weight gain and has been doing some stress eating and would like to \"nip it in the bud.\" The patient denies any chest pain or dyspnea.     She has recently developed some mild otalgia and nasal congestion in the last 1 to 2 weeks. She states she missed a dose of her allergy medication just before these symptoms started. This resolved for a few days before returning. She denies any fever.    The following portions of the patient's history were reviewed and updated as appropriate: allergies, current medications, past family history, past medical history, past social history, past surgical history, and problem list.  Past Medical History:   Diagnosis Date   • Allergic     Seasonal   • Asthma Childhood    Sports induced, inhaler not needed in years though   • Glaucoma 2008    Elevated eye pressure, but not an issue anymore   • Hypertension 2008    Controlled with diet, no longer an issue     Past Surgical History:   Procedure Laterality Date   • DENTAL PROCEDURE       Family History   Problem Relation Age of Onset   • Hyperlipidemia Mother    • Hypertension Mother    • Arthritis Mother    • Thyroid disease Mother    • Cancer Father         Bladder cancer   • Diabetes Paternal Grandmother    • Hearing loss Paternal Grandfather    • Anxiety disorder " "Sister    • Hearing loss Paternal Aunt    • Thyroid disease Paternal Aunt      Social History     Socioeconomic History   • Marital status:    Tobacco Use   • Smoking status: Never Smoker   • Smokeless tobacco: Never Used   Vaping Use   • Vaping Use: Never used   Substance and Sexual Activity   • Alcohol use: Yes     Comment: occ   • Drug use: Never   • Sexual activity: Yes     Partners: Male     Birth control/protection: I.U.D.     Comment: First and same partner since 2005         Current Outpatient Medications:   •  Cetirizine HCl 10 MG capsule, ZYRTEC ALLERGY 10 MG CAPS, Disp: , Rfl:   •  fluticasone (FLONASE) 50 MCG/ACT nasal spray, 2 sprays into the nostril(s) as directed by provider Daily., Disp: , Rfl:   •  Paragard Intrauterine Copper intrauterine device IUD, PARAGARD INTRAUTERINE COPPER IUD, Disp: , Rfl:   •  phentermine (ADIPEX-P) 37.5 MG tablet, Take 1 tablet by mouth Every Morning Before Breakfast., Disp: 30 tablet, Rfl: 0    Review of Systems   Constitutional: Negative.    HENT: Positive for ear pain.    Respiratory: Negative.    Cardiovascular: Negative.    Gastrointestinal: Negative.    Genitourinary: Negative.    Musculoskeletal: Negative.    Neurological: Negative.    Psychiatric/Behavioral: Negative.      /78 (BP Location: Right arm, Patient Position: Sitting, Cuff Size: Large Adult)   Pulse 79   Temp 98 °F (36.7 °C) (Temporal)   Ht 156.2 cm (61.5\")   Wt 78.9 kg (174 lb)   SpO2 98%   Breastfeeding No   BMI 32.34 kg/m²       Objective   Physical Exam  Vitals and nursing note reviewed.   Constitutional:       Appearance: Normal appearance. She is well-developed.      Comments: The patient is obese, well groomed, and in no acute distress. She is cooperative with the exam.   HENT:      Right Ear: Tympanic membrane normal.      Left Ear: Tympanic membrane normal.   Cardiovascular:      Comments: No pedal edema.  Pulmonary:      Effort: Pulmonary effort is normal.      Breath sounds: " Normal air entry.   Neurological:      Mental Status: She is alert and oriented to person, place, and time.      Motor: Motor function is intact.   Psychiatric:         Mood and Affect: Mood normal.         Behavior: Behavior normal.           Assessment/Plan   Problems Addressed this Visit        Endocrine and Metabolic    Weight loss counseling, encounter for - Primary    Relevant Medications    phentermine (ADIPEX-P) 37.5 MG tablet    Obesity (BMI 30.0-34.9)    Relevant Medications    phentermine (ADIPEX-P) 37.5 MG tablet      Other Visit Diagnoses     Otalgia of both ears          Diagnoses       Codes Comments    Weight loss counseling, encounter for    -  Primary ICD-10-CM: Z71.3  ICD-9-CM: V65.3     Obesity (BMI 30.0-34.9)     ICD-10-CM: E66.9  ICD-9-CM: 278.00     Otalgia of both ears     ICD-10-CM: H92.03  ICD-9-CM: 388.70         Obesity.   Her BMI is 32.3.     Encounter for weight loss counseling.    I will start her on phentermine 37.5 mg daily. She was counseled on the need for an improved diet and increased exercise. She is to follow up in 1 month.    Otalgia.   The examination was normal and I have reassured the patient.     Transcribed from ambient dictation for Deb Dejesus MD by Julieth Long.  03/25/22   10:01 EDT    Patient verbalized consent to the visit recording.

## 2022-04-22 ENCOUNTER — OFFICE VISIT (OUTPATIENT)
Dept: FAMILY MEDICINE CLINIC | Facility: CLINIC | Age: 32
End: 2022-04-22

## 2022-04-22 VITALS
OXYGEN SATURATION: 99 % | HEART RATE: 107 BPM | BODY MASS INDEX: 30.27 KG/M2 | TEMPERATURE: 98.6 F | HEIGHT: 62 IN | WEIGHT: 164.5 LBS | SYSTOLIC BLOOD PRESSURE: 102 MMHG | DIASTOLIC BLOOD PRESSURE: 70 MMHG

## 2022-04-22 DIAGNOSIS — E66.9 OBESITY (BMI 30.0-34.9): ICD-10-CM

## 2022-04-22 DIAGNOSIS — Z71.3 WEIGHT LOSS COUNSELING, ENCOUNTER FOR: ICD-10-CM

## 2022-04-22 PROCEDURE — 99213 OFFICE O/P EST LOW 20 MIN: CPT | Performed by: FAMILY MEDICINE

## 2022-04-22 RX ORDER — PHENTERMINE HYDROCHLORIDE 37.5 MG/1
37.5 TABLET ORAL
Qty: 30 TABLET | Refills: 0 | Status: SHIPPED | OUTPATIENT
Start: 2022-04-22 | End: 2022-05-20 | Stop reason: SDUPTHER

## 2022-04-22 NOTE — PROGRESS NOTES
Subjective   Ashley Kenney is a 32 y.o. female.     History of Present Illness   The patient presents for a follow-up after her first month on phentermine.     Weight loss  The patient confirms weight loss and reports her weight is currently 164 pounds. She reports she felt mild cardiac sensitivity after starting phentermine therapy, and she states it did not hinder her from completing daily activities. She reports the symptoms have resolved, and she is tolerating the medication well. The patient confirms she is being more active and reports she has increased her water consumption to 90 ounces to 1 gallon of water daily. She states she would like to continue therapy for a short time.     Ear pain  The patient reports she is experiencing ear discomfort.     The following portions of the patient's history were reviewed and updated as appropriate: allergies, current medications, past family history, past medical history, past social history, past surgical history, and problem list.  Past Medical History:   Diagnosis Date   • Allergic     Seasonal   • Asthma Childhood    Sports induced, inhaler not needed in years though   • Glaucoma 2008    Elevated eye pressure, but not an issue anymore   • Hypertension 2008    Controlled with diet, no longer an issue     Past Surgical History:   Procedure Laterality Date   • DENTAL PROCEDURE       Family History   Problem Relation Age of Onset   • Hyperlipidemia Mother    • Hypertension Mother    • Arthritis Mother    • Thyroid disease Mother    • Cancer Father         Bladder cancer   • Diabetes Paternal Grandmother    • Hearing loss Paternal Grandfather    • Anxiety disorder Sister    • Hearing loss Paternal Aunt    • Thyroid disease Paternal Aunt      Social History     Socioeconomic History   • Marital status:    Tobacco Use   • Smoking status: Never Smoker   • Smokeless tobacco: Never Used   Vaping Use   • Vaping Use: Never used   Substance and Sexual Activity   •  "Alcohol use: Yes     Comment: occ   • Drug use: Never   • Sexual activity: Yes     Partners: Male     Birth control/protection: I.U.D.     Comment: First and same partner since 2005         Current Outpatient Medications:   •  Cetirizine HCl 10 MG capsule, ZYRTEC ALLERGY 10 MG CAPS, Disp: , Rfl:   •  Paragard Intrauterine Copper intrauterine device IUD, PARAGARD INTRAUTERINE COPPER IUD, Disp: , Rfl:   •  phentermine (ADIPEX-P) 37.5 MG tablet, Take 1 tablet by mouth Every Morning Before Breakfast., Disp: 30 tablet, Rfl: 0    Review of Systems   Review of systems was performed, and pertinent findings are noted in the HPI.    /70 (BP Location: Right arm, Patient Position: Sitting, Cuff Size: Large Adult)   Pulse 107   Temp 98.6 °F (37 °C) (Temporal)   Ht 156.2 cm (61.5\")   Wt 74.6 kg (164 lb 8 oz)   SpO2 99%   Breastfeeding No   BMI 30.58 kg/m²       Objective   Physical Exam  Vitals and nursing note reviewed.   Constitutional:       General: She is not in acute distress.     Appearance: She is well-developed and well-groomed. She is obese.   HENT:      Head: Normocephalic and atraumatic.      Right Ear: Tympanic membrane normal.      Left Ear: Tympanic membrane normal.   Neck:      Thyroid: No thyromegaly.   Cardiovascular:      Rate and Rhythm: Normal rate and regular rhythm.      Heart sounds: Normal heart sounds. No murmur heard.    No friction rub. No gallop.      Comments: Her vitals are all stable.  Pulmonary:      Effort: Pulmonary effort is normal. No respiratory distress.      Breath sounds: Normal breath sounds. No wheezing or rales.   Musculoskeletal:      Cervical back: Neck supple.      Right lower leg: No edema.      Left lower leg: No edema.   Lymphadenopathy:      Cervical: No cervical adenopathy.   Skin:     General: Skin is warm and dry.   Neurological:      Mental Status: She is alert.      Comments: She is afebrile.   Psychiatric:         Behavior: Behavior is cooperative. "           Assessment/Plan   Problems Addressed this Visit        Endocrine and Metabolic    Weight loss counseling, encounter for  - She was counseled on continued efforts toward making healthier food choices and increasing her physical activity.    Relevant Medications    phentermine (ADIPEX-P) 37.5 MG tablet    Obesity (BMI 30.0-34.9)  - She has a BMI between 30 kg/m2 and 35 kg/m2.  - I refilled her phentermine.  - I will see her back in 1 month.    Relevant Medications    phentermine (ADIPEX-P) 37.5 MG tablet      Diagnoses       Codes Comments    Weight loss counseling, encounter for     ICD-10-CM: Z71.3  ICD-9-CM: V65.3     Obesity (BMI 30.0-34.9)     ICD-10-CM: E66.9  ICD-9-CM: 278.00                   Transcribed from ambient dictation for Deb Dejesus MD by Shanthi Chester.  04/22/22   14:03 EDT    Patient verbalized consent to the visit recording.

## 2022-05-20 ENCOUNTER — OFFICE VISIT (OUTPATIENT)
Dept: FAMILY MEDICINE CLINIC | Facility: CLINIC | Age: 32
End: 2022-05-20

## 2022-05-20 VITALS
BODY MASS INDEX: 29.63 KG/M2 | DIASTOLIC BLOOD PRESSURE: 85 MMHG | OXYGEN SATURATION: 99 % | HEIGHT: 62 IN | SYSTOLIC BLOOD PRESSURE: 133 MMHG | WEIGHT: 161 LBS | HEART RATE: 92 BPM | TEMPERATURE: 98.7 F

## 2022-05-20 DIAGNOSIS — Z71.3 WEIGHT LOSS COUNSELING, ENCOUNTER FOR: Primary | ICD-10-CM

## 2022-05-20 DIAGNOSIS — E66.3 OVERWEIGHT (BMI 25.0-29.9): ICD-10-CM

## 2022-05-20 PROCEDURE — 99213 OFFICE O/P EST LOW 20 MIN: CPT | Performed by: FAMILY MEDICINE

## 2022-05-20 RX ORDER — PHENTERMINE HYDROCHLORIDE 37.5 MG/1
37.5 TABLET ORAL
Qty: 30 TABLET | Refills: 0 | OUTPATIENT
Start: 2022-05-20 | End: 2022-12-04

## 2022-05-20 NOTE — PROGRESS NOTES
Subjective   Ashley Kenney is a 32 y.o. female who presents today for a follow-up after her second month on phentermine. She has lost another 3 pounds for a total of 10 in the last 2 months.     History of Present Illness     According to the patient, she has been able to control her cravings easier. She does have more energy. She would like to do 1 more month of phentermine. She denies any chest pain, trouble breathing, tachycardia.     The following portions of the patient's history were reviewed and updated as appropriate: allergies, current medications, past family history, past medical history, past social history, past surgical history, and problem list.  Past Medical History:   Diagnosis Date   • Allergic     Seasonal   • Asthma Childhood    Sports induced, inhaler not needed in years though   • Glaucoma 2008    Elevated eye pressure, but not an issue anymore   • Hypertension 2008    Controlled with diet, no longer an issue     Past Surgical History:   Procedure Laterality Date   • DENTAL PROCEDURE       Family History   Problem Relation Age of Onset   • Hyperlipidemia Mother    • Hypertension Mother    • Arthritis Mother    • Thyroid disease Mother    • Cancer Father         Bladder cancer   • Diabetes Paternal Grandmother    • Hearing loss Paternal Grandfather    • Anxiety disorder Sister    • Hearing loss Paternal Aunt    • Thyroid disease Paternal Aunt      Social History     Socioeconomic History   • Marital status:    Tobacco Use   • Smoking status: Never Smoker   • Smokeless tobacco: Never Used   Vaping Use   • Vaping Use: Never used   Substance and Sexual Activity   • Alcohol use: Yes     Comment: occ   • Drug use: Never   • Sexual activity: Yes     Partners: Male     Birth control/protection: I.U.D.     Comment: First and same partner since 2005         Current Outpatient Medications:   •  Cetirizine HCl 10 MG capsule, ZYRTEC ALLERGY 10 MG CAPS, Disp: , Rfl:   •  Paragard Intrauterine  "Copper intrauterine device IUD, PARAGARD INTRAUTERINE COPPER IUD, Disp: , Rfl:   •  phentermine (ADIPEX-P) 37.5 MG tablet, Take 1 tablet by mouth Every Morning Before Breakfast., Disp: 30 tablet, Rfl: 0    Review of Systems   Respiratory: Negative for shortness of breath.    Cardiovascular: Negative for chest pain and palpitations.     /85 (BP Location: Right arm, Patient Position: Sitting, Cuff Size: Large Adult)   Pulse 92   Temp 98.7 °F (37.1 °C) (Temporal)   Ht 156.2 cm (61.5\")   Wt 73 kg (161 lb)   SpO2 99%   Breastfeeding No   BMI 29.93 kg/m²       Objective   Physical Exam  Constitutional:       Comments: Vitals are all stable. She is afebrile, she is in no acute distress. She is overweight. She is well groomed. She is alert and cooperative with exam.   Neck:      Comments: Neck supple without adenopathy.   Cardiovascular:      Comments: Heart has regular rate and rhythm without murmur.  Pulmonary:      Comments:  Lungs are clear to auscultation bilaterally.          Assessment & Plan    1. Encounter for weight loss counseling and overweight with a body mass index between 25 and 29.9  -    I refilled her phentermine and I will see her back in 1 month. She was counseled on the need to continue to make healthier food choices and to continue to make exercise a big part of her life.      Problems Addressed this Visit        Endocrine and Metabolic    Weight loss counseling, encounter for - Primary    Relevant Medications    phentermine (ADIPEX-P) 37.5 MG tablet    Obesity (BMI 30.0-34.9)    Relevant Medications    phentermine (ADIPEX-P) 37.5 MG tablet      Diagnoses       Codes Comments    Weight loss counseling, encounter for    -  Primary ICD-10-CM: Z71.3  ICD-9-CM: V65.3     Overweight (BMI 25.0-29.9)     ICD-10-CM: E66.3  ICD-9-CM: 278.02             Transcribed from ambient dictation for Deb Dejesus MD by Kala Aguirre.  05/20/22   14:24 EDT    Patient verbalized consent to the " visit recording.

## 2022-12-02 ENCOUNTER — TELEPHONE (OUTPATIENT)
Dept: FAMILY MEDICINE CLINIC | Facility: CLINIC | Age: 32
End: 2022-12-02

## 2022-12-02 RX ORDER — CYCLOBENZAPRINE HCL 10 MG
10 TABLET ORAL 3 TIMES DAILY PRN
Qty: 30 TABLET | Refills: 0 | Status: SHIPPED | OUTPATIENT
Start: 2022-12-02

## 2022-12-02 NOTE — TELEPHONE ENCOUNTER
----- Message from Kenya Castro MA sent at 12/2/2022  3:32 PM EST -----  Regarding: FW: Right shoulder pain  Contact: [phone]    ----- Message -----  From: Ashley Kenney  Sent: 12/2/2022   3:21 PM EST  To: [facility]  Subject: Right shoulder pain                              Good afternoon. I am having level seven to nine pain in my right shoulder.  I've had it since last Saturday, and have just been treating it with ibuprofen, heat, Epsom salt baths, foam rolling, rest, and using a massage hook. Midday to early evening the pain will typically go down to a five sometimes a four. However, today it has never went below a seven. I did finally go see my chiropractor today. He adjusted me twice, once this morning then another time this afternoon. The adjustments I can tell have helped, but the pain is still there. What do you suggest I do? Do you feel I need a muscle relaxer and/or pain reliever since over the counter Ibuprofen and Tylenol are not helping much, or is there something else you suggest?

## 2022-12-02 NOTE — TELEPHONE ENCOUNTER
----- Message from Radha Valenzuela MA sent at 12/2/2022  3:45 PM EST -----  Regarding: FW: Right shoulder pain  Contact: [phone]    ----- Message -----  From: Ashley Kenney  Sent: 12/2/2022   3:41 PM EST  To: [facility]  Subject: Right shoulder pain                              Thank you!

## 2022-12-06 ENCOUNTER — TELEPHONE (OUTPATIENT)
Dept: FAMILY MEDICINE CLINIC | Facility: CLINIC | Age: 32
End: 2022-12-06

## 2022-12-06 DIAGNOSIS — M25.511 ACUTE PAIN OF RIGHT SHOULDER: Primary | ICD-10-CM

## 2022-12-06 DIAGNOSIS — R20.2 PARESTHESIAS: ICD-10-CM

## 2022-12-06 NOTE — TELEPHONE ENCOUNTER
----- Message from Lila Winkler MA sent at 12/6/2022  9:36 AM EST -----  Regarding: FW: Right shoulder pain  Contact: [phone]    ----- Message -----  From: Ashley Kenney  Sent: 12/6/2022   9:23 AM EST  To: [facility]  Subject: Right shoulder pain                              Good morning. I have been taking the muscle relaxer and it hasn't helped. I went in to urgent care because the pain was just unbearable over the weekend. He did some trigger point shots all around the area, but that didn't even help. He also prescribed me Prednisone and diclofenac, both of which aren't helping either. I'm having some numbness/ tingling in my thumb index, middle finger, and forearm of the right hand. I feel like I might have tore something or pinch something there. Before I schedule an appointment with you, did you want any x-rays or MRIs already done?

## 2022-12-16 ENCOUNTER — OFFICE VISIT (OUTPATIENT)
Dept: ORTHOPEDIC SURGERY | Facility: CLINIC | Age: 32
End: 2022-12-16

## 2022-12-16 VITALS — OXYGEN SATURATION: 99 % | WEIGHT: 180 LBS | HEART RATE: 93 BPM | HEIGHT: 61 IN | BODY MASS INDEX: 33.99 KG/M2

## 2022-12-16 DIAGNOSIS — M25.511 ACUTE PAIN OF RIGHT SHOULDER: Primary | ICD-10-CM

## 2022-12-16 PROCEDURE — 99203 OFFICE O/P NEW LOW 30 MIN: CPT | Performed by: FAMILY MEDICINE

## 2022-12-16 RX ORDER — MELOXICAM 15 MG/1
15 TABLET ORAL DAILY
Qty: 30 TABLET | Refills: 3 | Status: SHIPPED | OUTPATIENT
Start: 2022-12-16

## 2022-12-16 NOTE — PROGRESS NOTES
Primary Care Sports Medicine Office Visit Note     Patient ID: Ashley Kenney is a 32 y.o. female.    Chief Complaint:  Chief Complaint   Patient presents with   • Right Shoulder - Pain, Initial Evaluation     HPI:    Ms. Ashley Kenney is a 32 y.o. female. The patient presents to the clinic today for  right shoulder pain.    The patient reports that on 11/25/2022 or 11/26/2022 after doing laundry, she noticed a tweak in her right shoulder. She states that her soreness progressed for a week. She states that she was hand drying soaking wet comforters and throwing them overhead prior to experiencing her right shoulder pain and soreness. She reports that she began to apply heat and ice and was taking ibuprofen; however, she reports that her pain did not improve. She states that she went to urgent care and her pain level was a 7/10. She adds that her pain was so severe it would wake her out of her sleep. She reports that while in urgent care she was given a trigger point injection, prednisone, Flexeril and diclofenac; which she states only dulled her pain and made it more manageable. She adds that she just completed all of her medications.    She reports that while home caring for her ill daughter on, 12/13/2022, she reached her arm backwards and felt a very sharp, searing pain radiate down her arm. She states that the pain woke her completely out of her sleep and caused her to feel nauseous and dizzy. She states that her pain has improved but her right shoulder is still easily aggravated if she sleeps on it or moves it in a certain position. She reports that she is still experiencing tingling at the top of her forearm and posterior side of her hand. She states that she has a loss of sensation within her first three digits as well.     She states that years ago she had a pinched nerve for which she had to go to physical therapy for. She adds that she went to a chiropractor on, 12/02/2022, and was adjusted twice in 1  "day and states that it was not helpful. She states that her range of motion is currently normal.     The patient denies experiencing any issues with her bowels, having a fever or issues with her breathing.       Past Medical History:   Diagnosis Date   • Allergic     Seasonal   • Asthma Childhood    Sports induced, inhaler not needed in years though   • Glaucoma 2008    Elevated eye pressure, but not an issue anymore   • Hypertension 2008    Controlled with diet, no longer an issue       Past Surgical History:   Procedure Laterality Date   • DENTAL PROCEDURE         Family History   Problem Relation Age of Onset   • Hyperlipidemia Mother    • Hypertension Mother    • Arthritis Mother    • Thyroid disease Mother    • Cancer Father         Bladder cancer   • Diabetes Paternal Grandmother    • Hearing loss Paternal Grandfather    • Anxiety disorder Sister    • Hearing loss Paternal Aunt    • Thyroid disease Paternal Aunt      Social History     Occupational History   • Not on file   Tobacco Use   • Smoking status: Never   • Smokeless tobacco: Never   Vaping Use   • Vaping Use: Never used   Substance and Sexual Activity   • Alcohol use: Yes     Comment: occ   • Drug use: Never   • Sexual activity: Yes     Partners: Male     Birth control/protection: I.U.D.     Comment: First and same partner since 2005      Review of Systems   Constitutional: Negative for activity change and fever.   Respiratory: Negative for cough and shortness of breath.    Cardiovascular: Negative for chest pain.   Gastrointestinal: Negative for constipation, diarrhea, nausea and vomiting.   Musculoskeletal: Positive for arthralgias.   Skin: Negative for color change and rash.   Neurological: Negative for weakness.   Hematological: Does not bruise/bleed easily.     Objective:    Pulse 93   Ht 154.9 cm (61\")   Wt 81.6 kg (180 lb)   SpO2 99%   BMI 34.01 kg/m²     Physical Examination:  Physical Exam  Vitals and nursing note reviewed. "   Constitutional:       General: She is not in acute distress.     Appearance: She is well-developed. She is not diaphoretic.   HENT:      Head: Normocephalic and atraumatic.   Eyes:      Conjunctiva/sclera: Conjunctivae normal.   Pulmonary:      Effort: Pulmonary effort is normal. No respiratory distress.   Skin:     General: Skin is warm.      Capillary Refill: Capillary refill takes less than 2 seconds.   Neurological:      Mental Status: She is alert.       Right Shoulder Exam     Comments:  Right shoulder examination yields full range of motion to flexion, lateral abduction, inversion, and eversion. Bia test is mildly positive for pain. Resisted external rotation is positive, however, belly press is negative. Speed's test and Yergason's test are negative. Neer and Redd are both positive. Scarf test is negative. Spurling's maneuver to the right is strongly positive for cooling sensation with radiation to the first three digits.        Imaging and other tests:  Three view x-ray of the right shoulder today yields no acute bony abnormality.    Assessment and Plan:    1. Acute pain of right shoulder  - XR Shoulder 2+ View Right  - Ambulatory Referral to Physical Therapy  - meloxicam (Mobic) 15 MG tablet; Take 1 tablet by mouth Daily.  Dispense: 30 tablet; Refill: 3    2. Right shoulder pain    3. Cervical radiculopathy    4. Rotator cuff tendinitis    5. Subacromial bursitis    I discussed pathology and treatment options with the patient today. I recommended starting with physical therapy for stretching, strengthening of the rotator cuff, and cervical spinal stabilization with mild cervical radiculopathy as well. We will start meloxicam for anti-inflammatory benefit, patient elected to hold off on subacromial bursa injection today. She can always return for this modality if symptoms worsen or physical therapy is too difficult to participate in due to pain. Otherwise, return to clinic in 3 months for repeat  evaluation.      Transcribed from ambient dictation for Vega Winkler II, DO by Hannah Alcala.  12/16/22   13:16 EST    Patient or patient representative verbalized consent to the visit recording.  I have personally performed the services described in this document as transcribed by the above individual, and it is both accurate and complete.    Disclaimer: Please note that areas of this note were completed with computer voice recognition software.  Quite often unanticipated grammatical, syntax, homophones, and other interpretive errors are inadvertently transcribed by the computer software. Please excuse any errors that have escaped final proofreading.

## 2024-02-08 ENCOUNTER — OFFICE VISIT (OUTPATIENT)
Dept: FAMILY MEDICINE CLINIC | Facility: CLINIC | Age: 34
End: 2024-02-08
Payer: COMMERCIAL

## 2024-02-08 ENCOUNTER — LAB (OUTPATIENT)
Dept: FAMILY MEDICINE CLINIC | Facility: CLINIC | Age: 34
End: 2024-02-08
Payer: COMMERCIAL

## 2024-02-08 VITALS
SYSTOLIC BLOOD PRESSURE: 113 MMHG | TEMPERATURE: 98.4 F | HEIGHT: 60 IN | DIASTOLIC BLOOD PRESSURE: 78 MMHG | WEIGHT: 198 LBS | BODY MASS INDEX: 38.87 KG/M2 | HEART RATE: 76 BPM | OXYGEN SATURATION: 100 %

## 2024-02-08 DIAGNOSIS — R45.86 MOOD CHANGES: ICD-10-CM

## 2024-02-08 DIAGNOSIS — R63.5 WEIGHT GAIN: ICD-10-CM

## 2024-02-08 DIAGNOSIS — Z71.3 WEIGHT LOSS COUNSELING, ENCOUNTER FOR: Primary | ICD-10-CM

## 2024-02-08 LAB
ALBUMIN SERPL-MCNC: 4.4 G/DL (ref 3.5–5.2)
ALBUMIN/GLOB SERPL: 2 G/DL
ALP SERPL-CCNC: 72 U/L (ref 39–117)
ALT SERPL W P-5'-P-CCNC: 12 U/L (ref 1–33)
ANION GAP SERPL CALCULATED.3IONS-SCNC: 7 MMOL/L (ref 5–15)
AST SERPL-CCNC: 18 U/L (ref 1–32)
BASOPHILS # BLD AUTO: 0.05 10*3/MM3 (ref 0–0.2)
BASOPHILS NFR BLD AUTO: 0.7 % (ref 0–1.5)
BILIRUB SERPL-MCNC: 0.3 MG/DL (ref 0–1.2)
BUN SERPL-MCNC: 13 MG/DL (ref 6–20)
BUN/CREAT SERPL: 19.4 (ref 7–25)
CALCIUM SPEC-SCNC: 9.4 MG/DL (ref 8.6–10.5)
CHLORIDE SERPL-SCNC: 106 MMOL/L (ref 98–107)
CO2 SERPL-SCNC: 27 MMOL/L (ref 22–29)
CREAT SERPL-MCNC: 0.67 MG/DL (ref 0.57–1)
DEPRECATED RDW RBC AUTO: 39.8 FL (ref 37–54)
EGFRCR SERPLBLD CKD-EPI 2021: 117.8 ML/MIN/1.73
EOSINOPHIL # BLD AUTO: 0.08 10*3/MM3 (ref 0–0.4)
EOSINOPHIL NFR BLD AUTO: 1.1 % (ref 0.3–6.2)
ERYTHROCYTE [DISTWIDTH] IN BLOOD BY AUTOMATED COUNT: 12 % (ref 12.3–15.4)
GLOBULIN UR ELPH-MCNC: 2.2 GM/DL
GLUCOSE SERPL-MCNC: 89 MG/DL (ref 65–99)
HCT VFR BLD AUTO: 41.1 % (ref 34–46.6)
HGB BLD-MCNC: 13.3 G/DL (ref 12–15.9)
IMM GRANULOCYTES # BLD AUTO: 0.01 10*3/MM3 (ref 0–0.05)
IMM GRANULOCYTES NFR BLD AUTO: 0.1 % (ref 0–0.5)
LYMPHOCYTES # BLD AUTO: 2.7 10*3/MM3 (ref 0.7–3.1)
LYMPHOCYTES NFR BLD AUTO: 36.2 % (ref 19.6–45.3)
MCH RBC QN AUTO: 29.5 PG (ref 26.6–33)
MCHC RBC AUTO-ENTMCNC: 32.4 G/DL (ref 31.5–35.7)
MCV RBC AUTO: 91.1 FL (ref 79–97)
MONOCYTES # BLD AUTO: 0.66 10*3/MM3 (ref 0.1–0.9)
MONOCYTES NFR BLD AUTO: 8.9 % (ref 5–12)
NEUTROPHILS NFR BLD AUTO: 3.95 10*3/MM3 (ref 1.7–7)
NEUTROPHILS NFR BLD AUTO: 53 % (ref 42.7–76)
NRBC BLD AUTO-RTO: 0 /100 WBC (ref 0–0.2)
PLATELET # BLD AUTO: 245 10*3/MM3 (ref 140–450)
PMV BLD AUTO: 10.1 FL (ref 6–12)
POTASSIUM SERPL-SCNC: 4.4 MMOL/L (ref 3.5–5.2)
PROT SERPL-MCNC: 6.6 G/DL (ref 6–8.5)
RBC # BLD AUTO: 4.51 10*6/MM3 (ref 3.77–5.28)
SODIUM SERPL-SCNC: 140 MMOL/L (ref 136–145)
TSH SERPL DL<=0.05 MIU/L-ACNC: 1.32 UIU/ML (ref 0.27–4.2)
WBC NRBC COR # BLD AUTO: 7.45 10*3/MM3 (ref 3.4–10.8)

## 2024-02-08 PROCEDURE — 84443 ASSAY THYROID STIM HORMONE: CPT | Performed by: FAMILY MEDICINE

## 2024-02-08 PROCEDURE — 85025 COMPLETE CBC W/AUTO DIFF WBC: CPT | Performed by: FAMILY MEDICINE

## 2024-02-08 PROCEDURE — 36415 COLL VENOUS BLD VENIPUNCTURE: CPT

## 2024-02-08 PROCEDURE — 80053 COMPREHEN METABOLIC PANEL: CPT | Performed by: FAMILY MEDICINE

## 2024-02-08 NOTE — PROGRESS NOTES
Subjective   Ashley Kenney is a 34 y.o. female.     History of Present Illness  Comes in with concerns about her weight     Ms. Ashley Kenney her YOB: 1990, she is a 34-year-old female who presents today with concerns about her weight.    The patient has been staying fairly on track with her diet. She is doing a 80/20 diet. She explains she eats healthy 80 percent of the time. She is working out at least 5 to 6 days per week for moderate intensity for 30 minutes. She is doing weightlifting, HIIT training, and strength training. On 12/17/2023, she got down to 187 pounds, but now she is back up. This is the lowest she has been able to see in the last 10 pounds. She has tried phentermine in the past, and she did great with it. She was taking it for 1 year, and that is what got her down to 148 pounds. She did maintain that within 10 pounds. She has been doing [employer], and that is what also helped while she was on phentermine. Her goal is to figure out if she is starting perimenopause, or if she is starting her hormones causing the difficulty to actually lose to maintain. She has a ParaGard. She had her ParaGard reimplanted 2 years ago. Ever since then, she has had a harder time getting the weight off. She has been a lot moodier lately. Her  noticed and he is very kind and compassionate and keeps mouth shut. Her low thoughts, anxiety, and focus has been out the window. She has had a lot of stress.    The following portions of the patient's history were reviewed and updated as appropriate: allergies, current medications, past family history, past medical history, past social history, past surgical history, and problem list.  Past Medical History:   Diagnosis Date    Allergic     Seasonal    Asthma Childhood    Sports induced, inhaler not needed in years though    Glaucoma 2008    Elevated eye pressure, but not an issue anymore    Hypertension 2008    Controlled with diet, no longer an issue  "   Scoliosis December 2020    Very mild case     Past Surgical History:   Procedure Laterality Date    DENTAL PROCEDURE       Family History   Problem Relation Age of Onset    Hyperlipidemia Mother     Hypertension Mother     Arthritis Mother     Thyroid disease Mother     Diabetes Mother     Kidney disease Mother     Cancer Father         Bladder cancer    Diabetes Paternal Grandmother     Hearing loss Paternal Grandfather     Anxiety disorder Sister     Hearing loss Paternal Aunt     Thyroid disease Paternal Aunt      Social History     Socioeconomic History    Marital status:    Tobacco Use    Smoking status: Never    Smokeless tobacco: Never   Vaping Use    Vaping Use: Never used   Substance and Sexual Activity    Alcohol use: Yes     Comment: maybe 1 drink every other month if that.    Drug use: Never    Sexual activity: Yes     Partners: Male     Birth control/protection: I.U.D.     Comment: First and same partner since 2005         Current Outpatient Medications:     Cetirizine HCl 10 MG capsule, ZYRTEC ALLERGY 10 MG CAPS, Disp: , Rfl:     Paragard Intrauterine Copper intrauterine device IUD, PARAGARD INTRAUTERINE COPPER IUD, Disp: , Rfl:     Review of Systems    A review of systems was performed, and the pertinent positives are noted in the HPI.    /78 (BP Location: Right arm, Patient Position: Sitting, Cuff Size: Large Adult)   Pulse 76   Temp 98.4 °F (36.9 °C) (Temporal)   Ht 152.4 cm (60\")   Wt 89.8 kg (198 lb)   SpO2 100%   BMI 38.67 kg/m²   Class 2 Severe Obesity (BMI >=35 and <=39.9). Obesity-related health conditions include the following: none. Obesity is worsening. BMI is is above average; BMI management plan is completed. We discussed portion control and increasing exercise.       Objective   Physical Exam  Vitals and nursing note reviewed.   Constitutional:       Appearance: Normal appearance. She is obese.   Neck:      Thyroid: No thyromegaly.   Cardiovascular:      Rate and " Rhythm: Normal rate and regular rhythm.      Heart sounds: Normal heart sounds.   Pulmonary:      Effort: Pulmonary effort is normal.      Breath sounds: Normal breath sounds.   Musculoskeletal:      Cervical back: Neck supple.      Right lower leg: No edema.      Left lower leg: No edema.   Lymphadenopathy:      Cervical: No cervical adenopathy.   Neurological:      Mental Status: She is alert.           Assessment & Plan   Problems Addressed this Visit          Endocrine and Metabolic    Weight loss counseling, encounter for - Primary     Other Visit Diagnoses       Weight gain        Relevant Orders    Comprehensive Metabolic Panel    TSH    CBC & Differential    Mood changes        Relevant Orders    Comprehensive Metabolic Panel    TSH    CBC & Differential          Diagnoses         Codes Comments    Weight loss counseling, encounter for    -  Primary ICD-10-CM: Z71.3  ICD-9-CM: V65.3     Weight gain     ICD-10-CM: R63.5  ICD-9-CM: 783.1     Mood changes     ICD-10-CM: R45.86  ICD-9-CM: 296.90             1. Encounter for weight loss counseling, weight gain, and mood changes  - I have ordered a CMP, a TSH, and a CBC.   - I want her to keep doing what she is doing right now.  - I have encouraged her to make sure she is staying well hydrated, especially with water.   - Once I have these labs, we will address any abnormalities.   - She is already talking about wanting to see endocrinology if these are all normal.       Answers submitted by the patient for this visit:  Other (Submitted on 2/8/2024)  Please describe your symptoms.: Hormone and weight concerns  Have you had these symptoms before?: Yes  How long have you been having these symptoms?: Greater than 2 weeks  Please list any medications you are currently taking for this condition.: None  Please describe any probable cause for these symptoms. : Stress, age, hormones, etc  Primary Reason for Visit (Submitted on 2/8/2024)  What is the primary reason for your  visit?: Other            Transcribed from ambient dictation for Deb Dejesus MD by Radha Randall.  02/08/24   12:33 EST    Patient or patient representative verbalized consent to the visit recording.  I have personally performed the services described in this document as transcribed by the above individual, and it is both accurate and complete.

## 2024-02-12 ENCOUNTER — PATIENT MESSAGE (OUTPATIENT)
Dept: FAMILY MEDICINE CLINIC | Facility: CLINIC | Age: 34
End: 2024-02-12
Payer: COMMERCIAL

## 2024-02-12 DIAGNOSIS — R63.5 WEIGHT GAIN: Primary | ICD-10-CM

## 2024-02-12 RX ORDER — PHENTERMINE HYDROCHLORIDE 37.5 MG/1
37.5 CAPSULE ORAL EVERY MORNING
Qty: 30 CAPSULE | Refills: 0 | Status: SHIPPED | OUTPATIENT
Start: 2024-02-12

## 2024-03-15 ENCOUNTER — OFFICE VISIT (OUTPATIENT)
Dept: FAMILY MEDICINE CLINIC | Facility: CLINIC | Age: 34
End: 2024-03-15
Payer: COMMERCIAL

## 2024-03-15 VITALS
DIASTOLIC BLOOD PRESSURE: 84 MMHG | WEIGHT: 190 LBS | TEMPERATURE: 97.8 F | OXYGEN SATURATION: 99 % | BODY MASS INDEX: 37.3 KG/M2 | HEART RATE: 77 BPM | HEIGHT: 60 IN | SYSTOLIC BLOOD PRESSURE: 126 MMHG

## 2024-03-15 DIAGNOSIS — Z71.3 WEIGHT LOSS COUNSELING, ENCOUNTER FOR: Primary | ICD-10-CM

## 2024-03-15 DIAGNOSIS — E66.9 OBESITY (BMI 30-39.9): ICD-10-CM

## 2024-03-15 RX ORDER — PHENTERMINE HYDROCHLORIDE 37.5 MG/1
37.5 CAPSULE ORAL EVERY MORNING
Qty: 30 CAPSULE | Refills: 0 | Status: SHIPPED | OUTPATIENT
Start: 2024-03-15

## 2024-03-15 NOTE — PROGRESS NOTES
Subjective   Ashley Kenney is a 34 y.o. female.     History of Present Illness  Here for follow up after a month on phentermine     The patient is a 34-year-old female who comes in for follow-up after a month on phentermine.    The patient is tolerating the medicine well overall. She feels her heart palpitations throughout the day, but it is nothing that she cannot stop. She does not drink much caffeine. The most caffeine she has is pre-workout. She makes sure it is plant-based. She denies chest pain, heart racing, or trouble breathing. She thinks the phentermine is helping with her appetite. She is getting 25 to 30 minutes of exercise 5 days a week.    The following portions of the patient's history were reviewed and updated as appropriate: allergies, current medications, past family history, past medical history, past social history, past surgical history, and problem list.  Past Medical History:   Diagnosis Date    Allergic     Seasonal    Asthma Childhood    Sports induced, inhaler not needed in years though    Glaucoma 2008    Elevated eye pressure, but not an issue anymore    Hypertension 2008    Controlled with diet, no longer an issue    Scoliosis December 2020    Very mild case     Past Surgical History:   Procedure Laterality Date    DENTAL PROCEDURE       Family History   Problem Relation Age of Onset    Hyperlipidemia Mother     Hypertension Mother     Arthritis Mother     Thyroid disease Mother     Diabetes Mother     Kidney disease Mother     Cancer Father         Bladder cancer    Diabetes Paternal Grandmother     Hearing loss Paternal Grandfather     Anxiety disorder Sister     Hearing loss Paternal Aunt     Thyroid disease Paternal Aunt      Social History     Socioeconomic History    Marital status:    Tobacco Use    Smoking status: Never    Smokeless tobacco: Never   Vaping Use    Vaping status: Never Used   Substance and Sexual Activity    Alcohol use: Yes     Comment: maybe 1 drink  "every other month if that.    Drug use: Never    Sexual activity: Yes     Partners: Male     Birth control/protection: I.U.D.     Comment: First and same partner since 2005         Current Outpatient Medications:     Cetirizine HCl 10 MG capsule, ZYRTEC ALLERGY 10 MG CAPS, Disp: , Rfl:     Paragard Intrauterine Copper intrauterine device IUD, PARAGARD INTRAUTERINE COPPER IUD, Disp: , Rfl:     phentermine 37.5 MG capsule, Take 1 capsule by mouth Every Morning., Disp: 30 capsule, Rfl: 0    Review of Systems  /84 (BP Location: Left arm, Patient Position: Sitting, Cuff Size: Large Adult)   Pulse 77   Temp 97.8 °F (36.6 °C) (Temporal)   Ht 152.4 cm (60\")   Wt 86.2 kg (190 lb)   SpO2 99%   BMI 37.11 kg/m²       A review of systems was performed, and the pertinent positives are noted in the HPI.     Objective   Physical Exam  Vitals and nursing note reviewed.   Constitutional:       Appearance: Normal appearance. She is obese.   Cardiovascular:      Rate and Rhythm: Normal rate and regular rhythm.      Heart sounds: Normal heart sounds.   Pulmonary:      Effort: Pulmonary effort is normal.      Breath sounds: Normal breath sounds.   Musculoskeletal:      Right lower leg: No edema.      Left lower leg: No edema.   Neurological:      Mental Status: She is alert.           Assessment & Plan   Problems Addressed this Visit          Endocrine and Metabolic    Weight loss counseling, encounter for - Primary     Other Visit Diagnoses       Weight gain        Relevant Medications    phentermine 37.5 MG capsule          Diagnoses         Codes Comments    Weight loss counseling, encounter for    -  Primary ICD-10-CM: Z71.3  ICD-9-CM: V65.3     Weight gain     ICD-10-CM: R63.5  ICD-9-CM: 783.1         1. Weight loss counseling and obesity with a BMI between 30 and 39.9.  She was congratulated on her weight loss. I refilled her phentermine 37.5 mg. She was counseled and encouraged to continue her 150 minutes of exercise " per week.    Follow-up  The patient will follow up in 1 month.  Transcribed from ambient dictation for Deb Dejesus MD by Nguyễn Nevarez.  03/15/24   11:15 EDT    Patient or patient representative verbalized consent to the visit recording.  I have personally performed the services described in this document as transcribed by the above individual, and it is both accurate and complete.

## 2024-04-24 ENCOUNTER — LAB (OUTPATIENT)
Dept: LAB | Facility: HOSPITAL | Age: 34
End: 2024-04-24
Payer: COMMERCIAL

## 2024-04-24 ENCOUNTER — OFFICE VISIT (OUTPATIENT)
Dept: ENDOCRINOLOGY | Facility: CLINIC | Age: 34
End: 2024-04-24
Payer: COMMERCIAL

## 2024-04-24 VITALS
OXYGEN SATURATION: 100 % | BODY MASS INDEX: 38.09 KG/M2 | HEART RATE: 98 BPM | SYSTOLIC BLOOD PRESSURE: 126 MMHG | HEIGHT: 60 IN | DIASTOLIC BLOOD PRESSURE: 74 MMHG | WEIGHT: 194 LBS

## 2024-04-24 DIAGNOSIS — E28.2 POLYCYSTIC OVARIAN SYNDROME: Primary | ICD-10-CM

## 2024-04-24 DIAGNOSIS — E55.9 VITAMIN D DEFICIENCY: ICD-10-CM

## 2024-04-24 LAB
25(OH)D3 SERPL-MCNC: 26.5 NG/ML (ref 30–100)
FSH SERPL-ACNC: 3.88 MIU/ML
LH SERPL-ACNC: 5.09 MIU/ML
PROLACTIN SERPL-MCNC: 8.97 NG/ML (ref 4.79–23.3)

## 2024-04-24 PROCEDURE — 83002 ASSAY OF GONADOTROPIN (LH): CPT | Performed by: INTERNAL MEDICINE

## 2024-04-24 PROCEDURE — 82627 DEHYDROEPIANDROSTERONE: CPT | Performed by: INTERNAL MEDICINE

## 2024-04-24 PROCEDURE — 36415 COLL VENOUS BLD VENIPUNCTURE: CPT | Performed by: INTERNAL MEDICINE

## 2024-04-24 PROCEDURE — 99204 OFFICE O/P NEW MOD 45 MIN: CPT | Performed by: INTERNAL MEDICINE

## 2024-04-24 PROCEDURE — 84403 ASSAY OF TOTAL TESTOSTERONE: CPT | Performed by: INTERNAL MEDICINE

## 2024-04-24 PROCEDURE — 83001 ASSAY OF GONADOTROPIN (FSH): CPT | Performed by: INTERNAL MEDICINE

## 2024-04-24 PROCEDURE — 84146 ASSAY OF PROLACTIN: CPT | Performed by: INTERNAL MEDICINE

## 2024-04-24 PROCEDURE — 84402 ASSAY OF FREE TESTOSTERONE: CPT | Performed by: INTERNAL MEDICINE

## 2024-04-24 PROCEDURE — 82306 VITAMIN D 25 HYDROXY: CPT | Performed by: INTERNAL MEDICINE

## 2024-04-24 RX ORDER — METFORMIN HYDROCHLORIDE 500 MG/1
TABLET, EXTENDED RELEASE ORAL
Qty: 90 TABLET | Refills: 1 | Status: SHIPPED | OUTPATIENT
Start: 2024-04-24

## 2024-04-24 NOTE — PATIENT INSTRUCTIONS
Labs drawn today.  Will call you with the lab results.  Continue exercise & diet.  Try Phentermine 1/2 tab to see if you can tolerate.  Start metformin  mg one tab @ supper.  If well tolerated, after 2-4 weeks increase to 2 tab, then 3.  F/u in 6 months, with labs prior.

## 2024-04-25 ENCOUNTER — LAB (OUTPATIENT)
Dept: LAB | Facility: HOSPITAL | Age: 34
End: 2024-04-25
Payer: COMMERCIAL

## 2024-04-25 ENCOUNTER — PATIENT MESSAGE (OUTPATIENT)
Dept: ENDOCRINOLOGY | Facility: CLINIC | Age: 34
End: 2024-04-25
Payer: COMMERCIAL

## 2024-04-25 DIAGNOSIS — E66.9 OBESITY (BMI 30-39.9): Primary | ICD-10-CM

## 2024-04-25 DIAGNOSIS — E28.2 POLYCYSTIC OVARIAN SYNDROME: ICD-10-CM

## 2024-04-25 LAB — DHEA-S SERPL-MCNC: 183 UG/DL (ref 84.8–378)

## 2024-04-25 PROCEDURE — 82533 TOTAL CORTISOL: CPT

## 2024-04-25 RX ORDER — PHENTERMINE HYDROCHLORIDE 15 MG/1
15 CAPSULE ORAL EVERY MORNING
Qty: 30 CAPSULE | Refills: 1 | Status: SHIPPED | OUTPATIENT
Start: 2024-04-25

## 2024-04-26 ENCOUNTER — PATIENT ROUNDING (BHMG ONLY) (OUTPATIENT)
Dept: ENDOCRINOLOGY | Facility: CLINIC | Age: 34
End: 2024-04-26
Payer: COMMERCIAL

## 2024-04-26 NOTE — PROGRESS NOTES
April 26, 2024    Hello, may I speak with Ashley Kenney?    My name is Helena      I am  with [facility]  [address] [facility] [place] [address].    Before we get started may I verify your date of birth? 1990    I am calling to officially welcome you to our practice and ask about your recent visit. Is this a good time to talk? yes    Tell me about your visit with us. What things went well?  it went reallt well. She is very nice       We're always looking for ways to make our patients' experiences even better. Do you have recommendations on ways we may improve?  no    Overall were you satisfied with your first visit to our practice? yes       I appreciate you taking the time to speak with me today. Is there anything else I can do for you? no      Thank you, and have a great day.

## 2024-04-30 LAB
TESTOST FREE SERPL-MCNC: 1 PG/ML (ref 0–4.2)
TESTOST SERPL-MCNC: 18 NG/DL (ref 8–60)

## 2024-05-01 LAB — CORTIS SAL-MCNC: 0.03 UG/DL

## 2024-05-03 NOTE — PROGRESS NOTES
[facility]    Referring Provider: Deb Dejesus*  Reason for Consultation: Wt gain evaluation & management.    Patient Care Team:  Deb Dejesus MD as PCP - General  Deb Dejesus MD as PCP - Family Medicine    Chief complaint Weight Gain (NP / weight gain)      Subjective .     History of present illness:    This is a  34 y.o. female with wt & hormones concerns.  Top wt 250 lb in . Took phentermine & lost 100 lb in .  Also was working out regularly.  Still doing it 5-6d / week, but has regained 40 lb. Eatin.g healthy  Acne was better when she lost wt. No hair loss or hirsutism.  Tried to restart phentermine, but unable to tolerate 37.5 mg.  Taking multivitamins occasionally.  Menarche @ age 10y. Regular menses, but heavy with cramps. LMP 4/15.  [id number] baby wt 7 lb 8 oz, full term (). Gained 15 lb. No gestational DM.  Never smoked.      Review of Systems  Review of Systems   Constitutional:  Positive for unexpected weight gain.   HENT:  Negative for trouble swallowing.    Eyes:  Negative for blurred vision.   Cardiovascular:  Negative for leg swelling.   Gastrointestinal:  Positive for constipation. Negative for nausea.   Endocrine: Negative for polyuria.   Neurological:  Positive for dizziness, numbness and headache.     History  Past Medical History:   Diagnosis Date    Allergic     Seasonal    Asthma Childhood    Sports induced, inhaler not needed in years though    Glaucoma     Elevated eye pressure, but not an issue anymore    Hypertension     Controlled with diet, no longer an issue    Polycystic ovarian syndrome 2024    Scoliosis 2020    Very mild case     Past Surgical History:   Procedure Laterality Date    DENTAL PROCEDURE       Family History   Problem Relation Age of Onset    Hyperlipidemia Mother     Hypertension Mother     Arthritis Mother     Thyroid disease Mother     Diabetes Mother     Kidney disease  Mother     Obesity Mother     Cancer Father         Bladder cancer    Diabetes Paternal Grandmother     Hearing loss Paternal Grandfather     Anxiety disorder Sister     Hearing loss Paternal Aunt     Thyroid disease Paternal Aunt      Social History     Tobacco Use    Smoking status: Never    Smokeless tobacco: Never   Vaping Use    Vaping status: Never Used   Substance Use Topics    Alcohol use: Yes     Comment: maybe 1 drink every other month if that.    Drug use: Never        Allergies:  Other    Objective     Vital Signs      Vitals:    04/24/24 1010   BP: 126/74   Pulse: 98   SpO2: 100%         Physical Exam:     General Appearance:    Alert, cooperative, in no acute distress. Obese   Head:    Normocephalic, moderate acne   Eyes:            Lids and lashes normal, conjunctivae and sclerae normal, no   icterus, no pallor, corneas clear, PERRLA   Throat:   No oral lesions,  oral mucosa moist   Neck:   No adenopathy, supple,  no thyromegaly, no carotid bruit   Lungs:     Clear    Heart:    Regular rhythm and normal rate   Chest Wall:    No abnormalities observed   Abdomen:     Normal bowel sounds, soft                 Extremities:   Moves all extremities well, no edema               Pulses:   Pulses palpable and equal bilaterally   Skin:   Dry   Neurologic:  DTR 1+       Results Review  I have reviewed the patient's new clinical results, labs & imaging.    Na 140, K 4.4, cr 0.67, BUN 13, gl 89, Ca 9.4, ALT 12 on 2/8/2024    Lab Results   Component Value Date    TSH 1.320 02/08/2024     LH 5.09, FSH 3.88; DHEA-S 183.0; Prolactin 8.97; Total testosterone 18; salivary cortisol 0.031;  Vit D level 26.5.    Assessment & Plan     PCOS  Vitamin D Deficiency    Continue exercise & diet.  Try Phentermine 1/2 tab to see if you can tolerate.  Start metformin  mg one tab @ supper.  If well tolerated, after 2-4 weeks increase to 2 tab, then 3.  Restart multivitamins daily.    Info on PCOS & vit D given to pt.    I  discussed the patients findings and my recommendations with patient.    Mili Cortes MD  05/03/24  19:43 EDT

## 2024-07-09 DIAGNOSIS — E66.9 OBESITY (BMI 30-39.9): ICD-10-CM

## 2024-07-09 RX ORDER — PHENTERMINE HYDROCHLORIDE 15 MG/1
15 CAPSULE ORAL EVERY MORNING
Qty: 30 CAPSULE | Refills: 3 | Status: SHIPPED | OUTPATIENT
Start: 2024-07-09

## 2024-08-08 DIAGNOSIS — E66.9 OBESITY (BMI 30-39.9): ICD-10-CM

## 2024-08-09 DIAGNOSIS — E28.2 POLYCYSTIC OVARIAN SYNDROME: Primary | ICD-10-CM

## 2024-08-09 DIAGNOSIS — E55.9 VITAMIN D DEFICIENCY: ICD-10-CM

## 2024-08-09 RX ORDER — PHENTERMINE HYDROCHLORIDE 15 MG/1
15 CAPSULE ORAL EVERY MORNING
Qty: 1 CAPSULE | Refills: 0 | OUTPATIENT
Start: 2024-08-09

## 2025-01-08 ENCOUNTER — PATIENT MESSAGE (OUTPATIENT)
Dept: ENDOCRINOLOGY | Facility: CLINIC | Age: 35
End: 2025-01-08
Payer: COMMERCIAL

## 2025-01-08 DIAGNOSIS — E66.9 OBESITY (BMI 30-39.9): ICD-10-CM

## 2025-01-08 RX ORDER — PHENTERMINE HYDROCHLORIDE 15 MG/1
15 CAPSULE ORAL EVERY MORNING
Qty: 30 CAPSULE | Refills: 3 | Status: SHIPPED | OUTPATIENT
Start: 2025-01-08

## 2025-01-08 NOTE — TELEPHONE ENCOUNTER
Called & left msg for pt:  Sorry about all the issues you are facing.  I went ahead & send a 3 mo Rx for phentermine.  April will be 1y since I saw you.  So, that is as far as I can go without a visit.  Hope this helps.

## 2025-04-21 ENCOUNTER — LAB (OUTPATIENT)
Dept: ENDOCRINOLOGY | Facility: CLINIC | Age: 35
End: 2025-04-21
Payer: COMMERCIAL

## 2025-04-21 DIAGNOSIS — E55.9 VITAMIN D DEFICIENCY: ICD-10-CM

## 2025-04-21 DIAGNOSIS — E28.2 POLYCYSTIC OVARIAN SYNDROME: ICD-10-CM

## 2025-04-22 LAB
25(OH)D3+25(OH)D2 SERPL-MCNC: 27.5 NG/ML (ref 30–100)
ALBUMIN SERPL-MCNC: 4.2 G/DL (ref 3.9–4.9)
ALP SERPL-CCNC: 62 IU/L (ref 44–121)
ALT SERPL-CCNC: 14 IU/L (ref 0–32)
AST SERPL-CCNC: 15 IU/L (ref 0–40)
BILIRUB SERPL-MCNC: 0.4 MG/DL (ref 0–1.2)
BUN SERPL-MCNC: 16 MG/DL (ref 6–20)
BUN/CREAT SERPL: 26 (ref 9–23)
CALCIUM SERPL-MCNC: 9.2 MG/DL (ref 8.7–10.2)
CHLORIDE SERPL-SCNC: 103 MMOL/L (ref 96–106)
CO2 SERPL-SCNC: 21 MMOL/L (ref 20–29)
CREAT SERPL-MCNC: 0.61 MG/DL (ref 0.57–1)
EGFRCR SERPLBLD CKD-EPI 2021: 119 ML/MIN/1.73
GLOBULIN SER CALC-MCNC: 2.1 G/DL (ref 1.5–4.5)
GLUCOSE SERPL-MCNC: 85 MG/DL (ref 70–99)
POTASSIUM SERPL-SCNC: 4.3 MMOL/L (ref 3.5–5.2)
PROT SERPL-MCNC: 6.3 G/DL (ref 6–8.5)
SODIUM SERPL-SCNC: 137 MMOL/L (ref 134–144)

## 2025-04-30 ENCOUNTER — OFFICE VISIT (OUTPATIENT)
Dept: ENDOCRINOLOGY | Facility: CLINIC | Age: 35
End: 2025-04-30
Payer: COMMERCIAL

## 2025-04-30 VITALS
HEART RATE: 84 BPM | WEIGHT: 182 LBS | BODY MASS INDEX: 35.73 KG/M2 | HEIGHT: 60 IN | DIASTOLIC BLOOD PRESSURE: 70 MMHG | SYSTOLIC BLOOD PRESSURE: 118 MMHG | OXYGEN SATURATION: 99 %

## 2025-04-30 DIAGNOSIS — E55.9 VITAMIN D DEFICIENCY: ICD-10-CM

## 2025-04-30 DIAGNOSIS — E28.2 POLYCYSTIC OVARIAN SYNDROME: Primary | ICD-10-CM

## 2025-04-30 DIAGNOSIS — E66.9 OBESITY (BMI 30-39.9): ICD-10-CM

## 2025-04-30 PROCEDURE — 99214 OFFICE O/P EST MOD 30 MIN: CPT | Performed by: INTERNAL MEDICINE

## 2025-04-30 RX ORDER — ERGOCALCIFEROL 1.25 MG/1
50000 CAPSULE, LIQUID FILLED ORAL WEEKLY
Qty: 12 CAPSULE | Refills: 3 | Status: SHIPPED | OUTPATIENT
Start: 2025-04-30

## 2025-04-30 RX ORDER — PHENTERMINE HYDROCHLORIDE 15 MG/1
15 CAPSULE ORAL EVERY MORNING
Qty: 30 CAPSULE | Refills: 3 | Status: SHIPPED | OUTPATIENT
Start: 2025-04-30

## 2025-04-30 NOTE — PATIENT INSTRUCTIONS
Continue exercise.  Continue phentermine.  Take vit D 50,000 units once a week.  F/u in 1y, with labs prior.

## 2025-05-05 NOTE — PROGRESS NOTES
[facility]        Patient Care Team:  Deb Dejesus MD as PCP - General  Deb Dejesus MD as PCP - Family Medicine    Chief Complaint:    Chief Complaint   Patient presents with    Polycystic Ovary Syndrome     FU / PCOS         Subjective     Here for PCOS f/u  Could not take metformin  LMP now  Exercise program: yoga x 20 min 5-6 d / wk  Forgets to take vit D      Interval History:     Patient Comments: tolerating phentermine  Patient Denies:  Palpitations   History taken from: patient    Review of Systems:   Review of Systems   Eyes:  Negative for blurred vision.   Cardiovascular:  Negative for palpitations.   Gastrointestinal:  Negative for nausea.   Endocrine: Negative for polyuria.   Neurological:  Negative for tremors and headache.   Lost  12 lb since last visit  Objective     Vital Signs     Vitals:    04/30/25 1225   BP: 118/70   Pulse: 84   SpO2: 99%         Physical Exam:     General Appearance:    Alert, cooperative, in no acute distress, obese   Head:    Normocephalic, acne   Eyes:       Mouth:  Neck:       No periorbital edema. No lid lag    No lesions    No goiter   Lungs:     Clear     Heart:    Regular rhythm and normal rate   Abdomen:     Normal bowel sounds, soft                 Extremities:   Moves all extremities well, no edema or tremors               Pulses:   Pulses palpable and equal bilaterally   Skin:   Dry   Neurologic:  DTR 1+          Results Review:    I have reviewed the patient's new clinical results, labs & imaging.    Medication Review:   Prior to Admission medications    Medication Sig Start Date End Date Taking? Authorizing Provider   Cetirizine HCl 10 MG capsule ZYRTEC ALLERGY 10 MG CAPS 4/12/19  Yes Emergency, Nurse Epic, RN   Paragard Intrauterine Copper intrauterine device IUD PARAGARD INTRAUTERINE COPPER IUD 4/12/19  Yes Provider, MD Julio   phentermine 15 MG capsule Take 1 capsule by mouth Every Morning. 4/30/25  Yes  Mili Cortes MD   vitamin D (ERGOCALCIFEROL) 1.25 MG (63674 UT) capsule capsule Take 1 capsule by mouth 1 (One) Time Per Week. 4/30/25   Mili Cortes MD         Lab Results   Component Value Date    GLUCOSE 85 04/21/2025    BUN 16 04/21/2025    CREATININE 0.61 04/21/2025    EGFRIFNONA 94 06/30/2021    BCR 26 (H) 04/21/2025    K 4.3 04/21/2025    CO2 21 04/21/2025    CALCIUM 9.2 04/21/2025    ALBUMIN 4.2 04/21/2025    AST 15 04/21/2025    ALT 14 04/21/2025    CHOL 153 04/12/2019    LDL 87 04/12/2019    HDL 51 04/12/2019    TRIG 47 04/12/2019     Lab Results   Component Value Date    TSH 1.320 02/08/2024    FREET4 1.28 05/26/2020     Vit D level 27.5    Assessment & Plan     Diagnoses and all orders for this visit:    1. Polycystic ovarian syndrome (Primary)  -     Comprehensive Metabolic Panel; Future    2. Obesity (BMI 30-39.9)  -     phentermine 15 MG capsule; Take 1 capsule by mouth Every Morning.  Dispense: 30 capsule; Refill: 3  -     Comprehensive Metabolic Panel; Future    3. Vitamin D deficiency  -     vitamin D (ERGOCALCIFEROL) 1.25 MG (55747 UT) capsule capsule; Take 1 capsule by mouth 1 (One) Time Per Week.  Dispense: 12 capsule; Refill: 3  -     Vitamin D,25-Hydroxy; Future    PCOS stable. Vit D not @ goal.    Continue exercise.  Continue phentermine.  Take vit D 50,000 units once a week.        Mili Cortes MD  05/04/25  21:46 EDT